# Patient Record
Sex: MALE | Race: WHITE | URBAN - METROPOLITAN AREA
[De-identification: names, ages, dates, MRNs, and addresses within clinical notes are randomized per-mention and may not be internally consistent; named-entity substitution may affect disease eponyms.]

---

## 2024-03-01 LAB
ALBUMIN SERPL-MCNC: NORMAL G/DL
ALP BLD-CCNC: NORMAL U/L
ALT SERPL-CCNC: NORMAL U/L
ANION GAP SERPL CALCULATED.3IONS-SCNC: NORMAL MMOL/L
AST SERPL-CCNC: NORMAL U/L
BILIRUB SERPL-MCNC: NORMAL MG/DL
BILIRUBIN, URINE: NORMAL
BLOOD, URINE: NORMAL
BUN BLDV-MCNC: NORMAL MG/DL
C-REACTIVE PROTEIN: NORMAL
CALCIUM SERPL-MCNC: NORMAL MG/DL
CHLORIDE BLD-SCNC: NORMAL MMOL/L
CLARITY: NORMAL
CO2: NORMAL
COLOR: NORMAL
CREAT SERPL-MCNC: NORMAL MG/DL
EGFR: NORMAL
GLUCOSE BLD-MCNC: NORMAL MG/DL
GLUCOSE URINE: NORMAL
KETONES, URINE: NORMAL
LEUKOCYTE ESTERASE, URINE: NORMAL
LIPASE: NORMAL
NITRITE, URINE: NORMAL
PH UA: NORMAL
POTASSIUM SERPL-SCNC: NORMAL MMOL/L
PROTEIN UA: NORMAL
SODIUM BLD-SCNC: NORMAL MMOL/L
SPECIFIC GRAVITY, URINE: NORMAL
TOTAL PROTEIN: NORMAL
UROBILINOGEN, URINE: NORMAL

## 2024-03-26 ENCOUNTER — HOSPITAL ENCOUNTER (INPATIENT)
Age: 84
LOS: 7 days | Discharge: NURSING, REHAB OR LONG-TERM CARE | DRG: 286 | End: 2024-04-02
Attending: INTERNAL MEDICINE | Admitting: INTERNAL MEDICINE
Payer: MEDICARE

## 2024-03-26 ENCOUNTER — APPOINTMENT (OUTPATIENT)
Dept: GENERAL RADIOLOGY | Age: 84
DRG: 286 | End: 2024-03-26
Attending: INTERNAL MEDICINE
Payer: MEDICARE

## 2024-03-26 DIAGNOSIS — I21.4 NSTEMI (NON-ST ELEVATED MYOCARDIAL INFARCTION) (HCC): ICD-10-CM

## 2024-03-26 DIAGNOSIS — I50.9 ACUTE CONGESTIVE HEART FAILURE, UNSPECIFIED HEART FAILURE TYPE (HCC): Primary | ICD-10-CM

## 2024-03-26 DIAGNOSIS — R79.89 ELEVATED TROPONIN: ICD-10-CM

## 2024-03-26 DIAGNOSIS — M79.89 SWELLING OF LOWER EXTREMITY: ICD-10-CM

## 2024-03-26 DIAGNOSIS — Z86.718 HISTORY OF DVT (DEEP VEIN THROMBOSIS): ICD-10-CM

## 2024-03-26 DIAGNOSIS — Z13.9 ENCOUNTER FOR SCREENING INVOLVING SOCIAL DETERMINANTS OF HEALTH (SDOH): ICD-10-CM

## 2024-03-26 PROBLEM — I50.21 ACUTE SYSTOLIC CONGESTIVE HEART FAILURE (HCC): Status: ACTIVE | Noted: 2024-03-26

## 2024-03-26 PROBLEM — N18.31 STAGE 3A CHRONIC KIDNEY DISEASE (HCC): Status: ACTIVE | Noted: 2024-03-26

## 2024-03-26 PROBLEM — I10 PRIMARY HYPERTENSION: Status: ACTIVE | Noted: 2024-03-26

## 2024-03-26 PROBLEM — J41.8 MIXED SIMPLE AND MUCOPURULENT CHRONIC BRONCHITIS (HCC): Status: ACTIVE | Noted: 2024-03-26

## 2024-03-26 PROBLEM — I48.20 CHRONIC ATRIAL FIBRILLATION (HCC): Status: ACTIVE | Noted: 2024-03-26

## 2024-03-26 PROBLEM — E11.29 TYPE 2 DIABETES MELLITUS WITH KIDNEY COMPLICATION, WITH LONG-TERM CURRENT USE OF INSULIN (HCC): Status: ACTIVE | Noted: 2024-03-26

## 2024-03-26 PROBLEM — Z79.4 TYPE 2 DIABETES MELLITUS WITH KIDNEY COMPLICATION, WITH LONG-TERM CURRENT USE OF INSULIN (HCC): Status: ACTIVE | Noted: 2024-03-26

## 2024-03-26 LAB — GLUCOSE BLD-MCNC: 243 MG/DL (ref 74–99)

## 2024-03-26 PROCEDURE — 2140000000 HC CCU INTERMEDIATE R&B

## 2024-03-26 PROCEDURE — 36415 COLL VENOUS BLD VENIPUNCTURE: CPT

## 2024-03-26 PROCEDURE — 84484 ASSAY OF TROPONIN QUANT: CPT

## 2024-03-26 PROCEDURE — 84439 ASSAY OF FREE THYROXINE: CPT

## 2024-03-26 PROCEDURE — 99223 1ST HOSP IP/OBS HIGH 75: CPT | Performed by: NURSE PRACTITIONER

## 2024-03-26 PROCEDURE — 80048 BASIC METABOLIC PNL TOTAL CA: CPT

## 2024-03-26 PROCEDURE — 83880 ASSAY OF NATRIURETIC PEPTIDE: CPT

## 2024-03-26 PROCEDURE — 6370000000 HC RX 637 (ALT 250 FOR IP): Performed by: NURSE PRACTITIONER

## 2024-03-26 PROCEDURE — 83735 ASSAY OF MAGNESIUM: CPT

## 2024-03-26 PROCEDURE — 85025 COMPLETE CBC W/AUTO DIFF WBC: CPT

## 2024-03-26 PROCEDURE — 84443 ASSAY THYROID STIM HORMONE: CPT

## 2024-03-26 PROCEDURE — 2580000003 HC RX 258: Performed by: NURSE PRACTITIONER

## 2024-03-26 PROCEDURE — 71045 X-RAY EXAM CHEST 1 VIEW: CPT

## 2024-03-26 PROCEDURE — 2700000000 HC OXYGEN THERAPY PER DAY

## 2024-03-26 PROCEDURE — 82962 GLUCOSE BLOOD TEST: CPT

## 2024-03-26 PROCEDURE — 80061 LIPID PANEL: CPT

## 2024-03-26 RX ORDER — SODIUM CHLORIDE 9 MG/ML
INJECTION, SOLUTION INTRAVENOUS PRN
Status: DISCONTINUED | OUTPATIENT
Start: 2024-03-26 | End: 2024-04-01 | Stop reason: SDUPTHER

## 2024-03-26 RX ORDER — DILTIAZEM HYDROCHLORIDE 120 MG/1
120 CAPSULE, EXTENDED RELEASE ORAL DAILY
Status: ON HOLD | COMMUNITY
End: 2024-03-26

## 2024-03-26 RX ORDER — DEXTROSE MONOHYDRATE 100 MG/ML
INJECTION, SOLUTION INTRAVENOUS CONTINUOUS PRN
Status: DISCONTINUED | OUTPATIENT
Start: 2024-03-26 | End: 2024-04-02 | Stop reason: HOSPADM

## 2024-03-26 RX ORDER — LOSARTAN POTASSIUM 25 MG/1
25 TABLET ORAL DAILY
Status: DISCONTINUED | OUTPATIENT
Start: 2024-03-27 | End: 2024-04-02

## 2024-03-26 RX ORDER — ACETAMINOPHEN 325 MG/1
650 TABLET ORAL EVERY 6 HOURS PRN
Status: DISCONTINUED | OUTPATIENT
Start: 2024-03-26 | End: 2024-04-01 | Stop reason: SDUPTHER

## 2024-03-26 RX ORDER — INSULIN GLARGINE 100 [IU]/ML
0.25 INJECTION, SOLUTION SUBCUTANEOUS NIGHTLY
Status: DISCONTINUED | OUTPATIENT
Start: 2024-03-26 | End: 2024-03-27

## 2024-03-26 RX ORDER — POTASSIUM CHLORIDE 7.45 MG/ML
10 INJECTION INTRAVENOUS PRN
Status: DISCONTINUED | OUTPATIENT
Start: 2024-03-26 | End: 2024-03-29

## 2024-03-26 RX ORDER — LISINOPRIL 5 MG/1
5 TABLET ORAL DAILY
Status: ON HOLD | COMMUNITY
End: 2024-03-26

## 2024-03-26 RX ORDER — MAGNESIUM SULFATE IN WATER 40 MG/ML
2000 INJECTION, SOLUTION INTRAVENOUS PRN
Status: DISCONTINUED | OUTPATIENT
Start: 2024-03-26 | End: 2024-03-29

## 2024-03-26 RX ORDER — FUROSEMIDE 10 MG/ML
40 INJECTION INTRAMUSCULAR; INTRAVENOUS 2 TIMES DAILY
Status: COMPLETED | OUTPATIENT
Start: 2024-03-27 | End: 2024-03-29

## 2024-03-26 RX ORDER — ACETAMINOPHEN 650 MG/1
650 SUPPOSITORY RECTAL EVERY 6 HOURS PRN
Status: DISCONTINUED | OUTPATIENT
Start: 2024-03-26 | End: 2024-04-02 | Stop reason: HOSPADM

## 2024-03-26 RX ORDER — DOXAZOSIN 2 MG/1
2 TABLET ORAL DAILY
Status: DISCONTINUED | OUTPATIENT
Start: 2024-03-27 | End: 2024-04-02 | Stop reason: HOSPADM

## 2024-03-26 RX ORDER — SODIUM CHLORIDE 0.9 % (FLUSH) 0.9 %
5-40 SYRINGE (ML) INJECTION PRN
Status: DISCONTINUED | OUTPATIENT
Start: 2024-03-26 | End: 2024-04-02 | Stop reason: HOSPADM

## 2024-03-26 RX ORDER — TERAZOSIN 2 MG/1
2 CAPSULE ORAL NIGHTLY
Status: ON HOLD | COMMUNITY
End: 2024-03-26

## 2024-03-26 RX ORDER — ASPIRIN 81 MG/1
81 TABLET, CHEWABLE ORAL DAILY
Status: DISCONTINUED | OUTPATIENT
Start: 2024-03-27 | End: 2024-04-02 | Stop reason: HOSPADM

## 2024-03-26 RX ORDER — POLYETHYLENE GLYCOL 3350 17 G/17G
17 POWDER, FOR SOLUTION ORAL DAILY PRN
Status: DISCONTINUED | OUTPATIENT
Start: 2024-03-26 | End: 2024-04-02 | Stop reason: HOSPADM

## 2024-03-26 RX ORDER — ONDANSETRON 2 MG/ML
4 INJECTION INTRAMUSCULAR; INTRAVENOUS EVERY 6 HOURS PRN
Status: DISCONTINUED | OUTPATIENT
Start: 2024-03-26 | End: 2024-04-02 | Stop reason: HOSPADM

## 2024-03-26 RX ORDER — IPRATROPIUM BROMIDE AND ALBUTEROL SULFATE 2.5; .5 MG/3ML; MG/3ML
1 SOLUTION RESPIRATORY (INHALATION) EVERY 4 HOURS PRN
Status: DISCONTINUED | OUTPATIENT
Start: 2024-03-26 | End: 2024-04-02 | Stop reason: HOSPADM

## 2024-03-26 RX ORDER — SODIUM CHLORIDE 0.9 % (FLUSH) 0.9 %
5-40 SYRINGE (ML) INJECTION EVERY 12 HOURS SCHEDULED
Status: DISCONTINUED | OUTPATIENT
Start: 2024-03-26 | End: 2024-04-02 | Stop reason: HOSPADM

## 2024-03-26 RX ORDER — AMIODARONE HYDROCHLORIDE 200 MG/1
200 TABLET ORAL DAILY
Status: DISCONTINUED | OUTPATIENT
Start: 2024-03-27 | End: 2024-03-30

## 2024-03-26 RX ORDER — DILTIAZEM HYDROCHLORIDE 120 MG/1
120 CAPSULE, COATED, EXTENDED RELEASE ORAL DAILY
Status: DISCONTINUED | OUTPATIENT
Start: 2024-03-27 | End: 2024-03-30

## 2024-03-26 RX ORDER — INSULIN LISPRO 100 [IU]/ML
0-4 INJECTION, SOLUTION INTRAVENOUS; SUBCUTANEOUS
Status: DISCONTINUED | OUTPATIENT
Start: 2024-03-27 | End: 2024-04-02 | Stop reason: HOSPADM

## 2024-03-26 RX ORDER — GLUCAGON 1 MG/ML
1 KIT INJECTION PRN
Status: DISCONTINUED | OUTPATIENT
Start: 2024-03-26 | End: 2024-04-02 | Stop reason: HOSPADM

## 2024-03-26 RX ORDER — OMEPRAZOLE 20 MG/1
20 CAPSULE, DELAYED RELEASE ORAL DAILY
Status: ON HOLD | COMMUNITY
End: 2024-04-02

## 2024-03-26 RX ORDER — ONDANSETRON 4 MG/1
4 TABLET, ORALLY DISINTEGRATING ORAL EVERY 8 HOURS PRN
Status: DISCONTINUED | OUTPATIENT
Start: 2024-03-26 | End: 2024-04-02 | Stop reason: HOSPADM

## 2024-03-26 RX ORDER — INSULIN LISPRO 100 [IU]/ML
0-4 INJECTION, SOLUTION INTRAVENOUS; SUBCUTANEOUS NIGHTLY
Status: DISCONTINUED | OUTPATIENT
Start: 2024-03-26 | End: 2024-04-02 | Stop reason: HOSPADM

## 2024-03-26 RX ORDER — FUROSEMIDE 20 MG/1
20 TABLET ORAL 2 TIMES DAILY
Status: ON HOLD | COMMUNITY
End: 2024-03-26

## 2024-03-26 RX ORDER — FERROUS SULFATE 325(65) MG
325 TABLET ORAL
Status: DISCONTINUED | OUTPATIENT
Start: 2024-03-27 | End: 2024-04-02 | Stop reason: HOSPADM

## 2024-03-26 RX ORDER — POTASSIUM CHLORIDE 20 MEQ/1
40 TABLET, EXTENDED RELEASE ORAL PRN
Status: DISCONTINUED | OUTPATIENT
Start: 2024-03-26 | End: 2024-03-29

## 2024-03-26 RX ORDER — POTASSIUM CHLORIDE 750 MG/1
10 CAPSULE, EXTENDED RELEASE ORAL DAILY
Status: ON HOLD | COMMUNITY
End: 2024-03-26

## 2024-03-26 RX ADMIN — INSULIN GLARGINE 25 UNITS: 100 INJECTION, SOLUTION SUBCUTANEOUS at 23:23

## 2024-03-26 RX ADMIN — APIXABAN 5 MG: 5 TABLET, FILM COATED ORAL at 23:23

## 2024-03-26 RX ADMIN — SODIUM CHLORIDE, PRESERVATIVE FREE 10 ML: 5 INJECTION INTRAVENOUS at 23:34

## 2024-03-27 ENCOUNTER — APPOINTMENT (OUTPATIENT)
Age: 84
DRG: 286 | End: 2024-03-27
Attending: INTERNAL MEDICINE
Payer: MEDICARE

## 2024-03-27 PROBLEM — I50.9 ACUTE CONGESTIVE HEART FAILURE, UNSPECIFIED HEART FAILURE TYPE (HCC): Status: ACTIVE | Noted: 2024-03-27

## 2024-03-27 PROBLEM — M79.89 SWELLING OF LOWER EXTREMITY: Status: ACTIVE | Noted: 2024-03-27

## 2024-03-27 PROBLEM — I48.0 PAF (PAROXYSMAL ATRIAL FIBRILLATION) (HCC): Status: ACTIVE | Noted: 2024-03-27

## 2024-03-27 PROBLEM — D64.9 ANEMIA: Status: ACTIVE | Noted: 2024-03-27

## 2024-03-27 PROBLEM — I50.9 ACUTE CONGESTIVE HEART FAILURE (HCC): Status: ACTIVE | Noted: 2024-03-27

## 2024-03-27 LAB
ANION GAP SERPL CALCULATED.3IONS-SCNC: 9 MMOL/L (ref 7–16)
ANION GAP SERPL CALCULATED.3IONS-SCNC: 9 MMOL/L (ref 7–16)
B PARAP IS1001 DNA NPH QL NAA+NON-PROBE: NOT DETECTED
B PERT DNA SPEC QL NAA+PROBE: NOT DETECTED
BASOPHILS # BLD: 0.04 K/UL (ref 0–0.2)
BASOPHILS NFR BLD: 0 % (ref 0–2)
BNP SERPL-MCNC: 2914 PG/ML (ref 0–450)
BUN SERPL-MCNC: 22 MG/DL (ref 6–23)
BUN SERPL-MCNC: 24 MG/DL (ref 6–23)
C PNEUM DNA NPH QL NAA+NON-PROBE: NOT DETECTED
CALCIUM SERPL-MCNC: 8.5 MG/DL (ref 8.6–10.2)
CALCIUM SERPL-MCNC: 8.6 MG/DL (ref 8.6–10.2)
CHLORIDE SERPL-SCNC: 106 MMOL/L (ref 98–107)
CHLORIDE SERPL-SCNC: 106 MMOL/L (ref 98–107)
CHOLEST SERPL-MCNC: 138 MG/DL
CO2 SERPL-SCNC: 23 MMOL/L (ref 22–29)
CO2 SERPL-SCNC: 26 MMOL/L (ref 22–29)
CREAT SERPL-MCNC: 1.2 MG/DL (ref 0.7–1.2)
CREAT SERPL-MCNC: 1.3 MG/DL (ref 0.7–1.2)
ECHO AV AREA PEAK VELOCITY: 2 CM2
ECHO AV AREA VTI: 1.9 CM2
ECHO AV CUSP MM: 1.8 CM
ECHO AV MEAN GRADIENT: 8 MMHG
ECHO AV MEAN VELOCITY: 1.3 M/S
ECHO AV PEAK GRADIENT: 13 MMHG
ECHO AV PEAK VELOCITY: 1.8 M/S
ECHO AV VELOCITY RATIO: 0.67
ECHO AV VTI: 34.5 CM
ECHO EST RA PRESSURE: 3 MMHG
ECHO LA DIAMETER: 4.4 CM
ECHO LA VOL A-L A2C: 79 ML (ref 18–58)
ECHO LA VOL A-L A4C: 81 ML (ref 18–58)
ECHO LA VOL MOD A2C: 76 ML (ref 18–58)
ECHO LA VOL MOD A4C: 79 ML (ref 18–58)
ECHO LA VOLUME AREA LENGTH: 82 ML
ECHO LV FRACTIONAL SHORTENING: 35 % (ref 28–44)
ECHO LV INTERNAL DIMENSION DIASTOLIC: 5.7 CM (ref 4.2–5.9)
ECHO LV INTERNAL DIMENSION SYSTOLIC: 3.7 CM
ECHO LV IVSD: 1.1 CM (ref 0.6–1)
ECHO LV MASS 2D: 256.7 G (ref 88–224)
ECHO LV POSTERIOR WALL DIASTOLIC: 1.1 CM (ref 0.6–1)
ECHO LV RELATIVE WALL THICKNESS RATIO: 0.39
ECHO LVOT AREA: 3.1 CM2
ECHO LVOT AV VTI INDEX: 0.63
ECHO LVOT DIAM: 2 CM
ECHO LVOT MEAN GRADIENT: 3 MMHG
ECHO LVOT PEAK GRADIENT: 6 MMHG
ECHO LVOT PEAK VELOCITY: 1.2 M/S
ECHO LVOT SV: 67.8 ML
ECHO LVOT VTI: 21.6 CM
ECHO MV "A" WAVE DURATION: 110.7 MSEC
ECHO MV A VELOCITY: 0.84 M/S
ECHO MV AREA PHT: 3.8 CM2
ECHO MV AREA VTI: 1.8 CM2
ECHO MV E DECELERATION TIME (DT): 262.2 MS
ECHO MV E VELOCITY: 1.04 M/S
ECHO MV E/A RATIO: 1.24
ECHO MV LVOT VTI INDEX: 1.74
ECHO MV MAX VELOCITY: 1.3 M/S
ECHO MV MEAN GRADIENT: 3 MMHG
ECHO MV MEAN VELOCITY: 0.8 M/S
ECHO MV PEAK GRADIENT: 7 MMHG
ECHO MV PRESSURE HALF TIME (PHT): 58.4 MS
ECHO MV VTI: 37.6 CM
ECHO RIGHT VENTRICULAR SYSTOLIC PRESSURE (RVSP): 30 MMHG
ECHO TV REGURGITANT MAX VELOCITY: 2.61 M/S
ECHO TV REGURGITANT PEAK GRADIENT: 27 MMHG
EKG ATRIAL RATE: 108 BPM
EKG Q-T INTERVAL: 384 MS
EKG QRS DURATION: 150 MS
EKG QTC CALCULATION (BAZETT): 519 MS
EKG R AXIS: -69 DEGREES
EKG T AXIS: 16 DEGREES
EKG VENTRICULAR RATE: 110 BPM
EOSINOPHIL # BLD: 0.56 K/UL (ref 0.05–0.5)
EOSINOPHILS RELATIVE PERCENT: 6 % (ref 0–6)
ERYTHROCYTE [DISTWIDTH] IN BLOOD BY AUTOMATED COUNT: 13.8 % (ref 11.5–15)
FLUAV RNA NPH QL NAA+NON-PROBE: NOT DETECTED
FLUBV RNA NPH QL NAA+NON-PROBE: NOT DETECTED
GFR SERPL CREATININE-BSD FRML MDRD: 56 ML/MIN/1.73M2
GFR SERPL CREATININE-BSD FRML MDRD: 61 ML/MIN/1.73M2
GLUCOSE BLD-MCNC: 130 MG/DL (ref 74–99)
GLUCOSE BLD-MCNC: 130 MG/DL (ref 74–99)
GLUCOSE BLD-MCNC: 93 MG/DL (ref 74–99)
GLUCOSE BLD-MCNC: 95 MG/DL (ref 74–99)
GLUCOSE SERPL-MCNC: 187 MG/DL (ref 74–99)
GLUCOSE SERPL-MCNC: 82 MG/DL (ref 74–99)
HADV DNA NPH QL NAA+NON-PROBE: NOT DETECTED
HCOV 229E RNA NPH QL NAA+NON-PROBE: NOT DETECTED
HCOV HKU1 RNA NPH QL NAA+NON-PROBE: NOT DETECTED
HCOV NL63 RNA NPH QL NAA+NON-PROBE: NOT DETECTED
HCOV OC43 RNA NPH QL NAA+NON-PROBE: NOT DETECTED
HCT VFR BLD AUTO: 25.5 % (ref 37–54)
HDLC SERPL-MCNC: 42 MG/DL
HGB BLD-MCNC: 8.3 G/DL (ref 12.5–16.5)
HMPV RNA NPH QL NAA+NON-PROBE: NOT DETECTED
HPIV1 RNA NPH QL NAA+NON-PROBE: NOT DETECTED
HPIV2 RNA NPH QL NAA+NON-PROBE: NOT DETECTED
HPIV3 RNA NPH QL NAA+NON-PROBE: NOT DETECTED
HPIV4 RNA NPH QL NAA+NON-PROBE: NOT DETECTED
IMM GRANULOCYTES # BLD AUTO: 0.04 K/UL (ref 0–0.58)
IMM GRANULOCYTES NFR BLD: 0 % (ref 0–5)
L PNEUMO1 AG UR QL IA.RAPID: NEGATIVE
LDLC SERPL CALC-MCNC: 84 MG/DL
LYMPHOCYTES NFR BLD: 0.95 K/UL (ref 1.5–4)
LYMPHOCYTES RELATIVE PERCENT: 10 % (ref 20–42)
M PNEUMO DNA NPH QL NAA+NON-PROBE: NOT DETECTED
MAGNESIUM SERPL-MCNC: 1.7 MG/DL (ref 1.6–2.6)
MAGNESIUM SERPL-MCNC: 1.7 MG/DL (ref 1.6–2.6)
MCH RBC QN AUTO: 32 PG (ref 26–35)
MCHC RBC AUTO-ENTMCNC: 32.5 G/DL (ref 32–34.5)
MCV RBC AUTO: 98.5 FL (ref 80–99.9)
MONOCYTES NFR BLD: 0.95 K/UL (ref 0.1–0.95)
MONOCYTES NFR BLD: 10 % (ref 2–12)
NEUTROPHILS NFR BLD: 73 % (ref 43–80)
NEUTS SEG NFR BLD: 6.97 K/UL (ref 1.8–7.3)
PLATELET # BLD AUTO: 213 K/UL (ref 130–450)
PMV BLD AUTO: 10.9 FL (ref 7–12)
POTASSIUM SERPL-SCNC: 3.6 MMOL/L (ref 3.5–5)
POTASSIUM SERPL-SCNC: 4.2 MMOL/L (ref 3.5–5)
PROCALCITONIN SERPL-MCNC: 0.08 NG/ML (ref 0–0.08)
RBC # BLD AUTO: 2.59 M/UL (ref 3.8–5.8)
RSV RNA NPH QL NAA+NON-PROBE: NOT DETECTED
RV+EV RNA NPH QL NAA+NON-PROBE: NOT DETECTED
S PNEUM AG SPEC QL: NEGATIVE
SARS-COV-2 RNA NPH QL NAA+NON-PROBE: NOT DETECTED
SODIUM SERPL-SCNC: 138 MMOL/L (ref 132–146)
SODIUM SERPL-SCNC: 141 MMOL/L (ref 132–146)
SPECIMEN DESCRIPTION: NORMAL
SPECIMEN SOURCE: NORMAL
T4 FREE SERPL-MCNC: 1.2 NG/DL (ref 0.9–1.7)
TRIGL SERPL-MCNC: 58 MG/DL
TROPONIN I SERPL HS-MCNC: 134 NG/L (ref 0–11)
TROPONIN I SERPL HS-MCNC: 137 NG/L (ref 0–11)
TSH SERPL DL<=0.05 MIU/L-ACNC: 5.11 UIU/ML (ref 0.27–4.2)
VLDLC SERPL CALC-MCNC: 12 MG/DL
WBC OTHER # BLD: 9.5 K/UL (ref 4.5–11.5)

## 2024-03-27 PROCEDURE — 83735 ASSAY OF MAGNESIUM: CPT

## 2024-03-27 PROCEDURE — 6370000000 HC RX 637 (ALT 250 FOR IP): Performed by: NURSE PRACTITIONER

## 2024-03-27 PROCEDURE — 2580000003 HC RX 258: Performed by: NURSE PRACTITIONER

## 2024-03-27 PROCEDURE — 84484 ASSAY OF TROPONIN QUANT: CPT

## 2024-03-27 PROCEDURE — 93306 TTE W/DOPPLER COMPLETE: CPT

## 2024-03-27 PROCEDURE — 87899 AGENT NOS ASSAY W/OPTIC: CPT

## 2024-03-27 PROCEDURE — 87449 NOS EACH ORGANISM AG IA: CPT

## 2024-03-27 PROCEDURE — 93005 ELECTROCARDIOGRAM TRACING: CPT | Performed by: NURSE PRACTITIONER

## 2024-03-27 PROCEDURE — 6360000002 HC RX W HCPCS: Performed by: INTERNAL MEDICINE

## 2024-03-27 PROCEDURE — 99223 1ST HOSP IP/OBS HIGH 75: CPT | Performed by: INTERNAL MEDICINE

## 2024-03-27 PROCEDURE — 0202U NFCT DS 22 TRGT SARS-COV-2: CPT

## 2024-03-27 PROCEDURE — 2700000000 HC OXYGEN THERAPY PER DAY

## 2024-03-27 PROCEDURE — 94640 AIRWAY INHALATION TREATMENT: CPT

## 2024-03-27 PROCEDURE — 2140000000 HC CCU INTERMEDIATE R&B

## 2024-03-27 PROCEDURE — 84145 PROCALCITONIN (PCT): CPT

## 2024-03-27 PROCEDURE — 99233 SBSQ HOSP IP/OBS HIGH 50: CPT | Performed by: INTERNAL MEDICINE

## 2024-03-27 PROCEDURE — 36415 COLL VENOUS BLD VENIPUNCTURE: CPT

## 2024-03-27 PROCEDURE — 93306 TTE W/DOPPLER COMPLETE: CPT | Performed by: INTERNAL MEDICINE

## 2024-03-27 PROCEDURE — 6370000000 HC RX 637 (ALT 250 FOR IP): Performed by: INTERNAL MEDICINE

## 2024-03-27 PROCEDURE — APPSS180 APP SPLIT SHARED TIME > 60 MINUTES: Performed by: NURSE PRACTITIONER

## 2024-03-27 PROCEDURE — 93010 ELECTROCARDIOGRAM REPORT: CPT | Performed by: INTERNAL MEDICINE

## 2024-03-27 PROCEDURE — 80048 BASIC METABOLIC PNL TOTAL CA: CPT

## 2024-03-27 PROCEDURE — 6360000002 HC RX W HCPCS: Performed by: NURSE PRACTITIONER

## 2024-03-27 PROCEDURE — 82962 GLUCOSE BLOOD TEST: CPT

## 2024-03-27 PROCEDURE — 99222 1ST HOSP IP/OBS MODERATE 55: CPT

## 2024-03-27 RX ORDER — BUDESONIDE 0.5 MG/2ML
0.5 INHALANT ORAL
Status: DISCONTINUED | OUTPATIENT
Start: 2024-03-27 | End: 2024-04-02 | Stop reason: HOSPADM

## 2024-03-27 RX ORDER — PETROLATUM 42 G/100G
OINTMENT TOPICAL 2 TIMES DAILY
Status: DISCONTINUED | OUTPATIENT
Start: 2024-03-27 | End: 2024-03-27

## 2024-03-27 RX ORDER — IPRATROPIUM BROMIDE AND ALBUTEROL SULFATE 2.5; .5 MG/3ML; MG/3ML
1 SOLUTION RESPIRATORY (INHALATION)
Status: DISCONTINUED | OUTPATIENT
Start: 2024-03-27 | End: 2024-04-02 | Stop reason: HOSPADM

## 2024-03-27 RX ORDER — ARFORMOTEROL TARTRATE 15 UG/2ML
15 SOLUTION RESPIRATORY (INHALATION)
Status: DISCONTINUED | OUTPATIENT
Start: 2024-03-27 | End: 2024-04-02 | Stop reason: HOSPADM

## 2024-03-27 RX ORDER — MAGNESIUM SULFATE IN WATER 40 MG/ML
2000 INJECTION, SOLUTION INTRAVENOUS ONCE
Status: COMPLETED | OUTPATIENT
Start: 2024-03-27 | End: 2024-03-27

## 2024-03-27 RX ADMIN — POTASSIUM CHLORIDE 40 MEQ: 1500 TABLET, EXTENDED RELEASE ORAL at 10:31

## 2024-03-27 RX ADMIN — SODIUM CHLORIDE, PRESERVATIVE FREE 10 ML: 5 INJECTION INTRAVENOUS at 08:33

## 2024-03-27 RX ADMIN — APIXABAN 5 MG: 5 TABLET, FILM COATED ORAL at 08:33

## 2024-03-27 RX ADMIN — IPRATROPIUM BROMIDE AND ALBUTEROL SULFATE 1 DOSE: 2.5; .5 SOLUTION RESPIRATORY (INHALATION) at 00:27

## 2024-03-27 RX ADMIN — DOXAZOSIN 2 MG: 2 TABLET ORAL at 08:32

## 2024-03-27 RX ADMIN — FUROSEMIDE 40 MG: 10 INJECTION, SOLUTION INTRAMUSCULAR; INTRAVENOUS at 08:33

## 2024-03-27 RX ADMIN — BUDESONIDE INHALATION 500 MCG: 0.5 SUSPENSION RESPIRATORY (INHALATION) at 12:40

## 2024-03-27 RX ADMIN — IPRATROPIUM BROMIDE AND ALBUTEROL SULFATE 1 DOSE: 2.5; .5 SOLUTION RESPIRATORY (INHALATION) at 19:31

## 2024-03-27 RX ADMIN — IPRATROPIUM BROMIDE AND ALBUTEROL SULFATE 1 DOSE: 2.5; .5 SOLUTION RESPIRATORY (INHALATION) at 15:54

## 2024-03-27 RX ADMIN — LOSARTAN POTASSIUM 25 MG: 25 TABLET, FILM COATED ORAL at 08:33

## 2024-03-27 RX ADMIN — FERROUS SULFATE TAB 325 MG (65 MG ELEMENTAL FE) 325 MG: 325 (65 FE) TAB at 08:33

## 2024-03-27 RX ADMIN — FUROSEMIDE 40 MG: 10 INJECTION, SOLUTION INTRAMUSCULAR; INTRAVENOUS at 17:29

## 2024-03-27 RX ADMIN — DILTIAZEM HYDROCHLORIDE 120 MG: 120 CAPSULE, COATED, EXTENDED RELEASE ORAL at 08:33

## 2024-03-27 RX ADMIN — ARFORMOTEROL TARTRATE 15 MCG: 15 SOLUTION RESPIRATORY (INHALATION) at 12:40

## 2024-03-27 RX ADMIN — ARFORMOTEROL TARTRATE 15 MCG: 15 SOLUTION RESPIRATORY (INHALATION) at 19:31

## 2024-03-27 RX ADMIN — SODIUM CHLORIDE, PRESERVATIVE FREE 10 ML: 5 INJECTION INTRAVENOUS at 20:48

## 2024-03-27 RX ADMIN — AMIODARONE HYDROCHLORIDE 200 MG: 200 TABLET ORAL at 08:33

## 2024-03-27 RX ADMIN — APIXABAN 5 MG: 5 TABLET, FILM COATED ORAL at 20:41

## 2024-03-27 RX ADMIN — MAGNESIUM SULFATE HEPTAHYDRATE 2000 MG: 40 INJECTION, SOLUTION INTRAVENOUS at 10:35

## 2024-03-27 RX ADMIN — PETROLATUM: 420 OINTMENT TOPICAL at 20:49

## 2024-03-27 RX ADMIN — ASPIRIN 81 MG: 81 TABLET, CHEWABLE ORAL at 08:33

## 2024-03-27 RX ADMIN — BUDESONIDE INHALATION 500 MCG: 0.5 SUSPENSION RESPIRATORY (INHALATION) at 19:31

## 2024-03-27 NOTE — FLOWSHEET NOTE
[facility] the [facility] belongings. Pt oriented to room and all questions answered.   03/26/24 2111   Belongings   Dental Appliances Uppers   Vision - Corrective Lenses Eyeglasses;At home   Hearing Aid None   Clothing At home   Jewelry Watch   Electronic Devices None   Weapons (Notify Protective Services/Security) None   Home Medications None   Valuables Given To Patient   Provide Name(s) of Who Valuable(s) Were Given To Luis

## 2024-03-27 NOTE — H&P
Hospitalist History & Physical      PCP: No primary care provider on file.    Date of Service: Pt seen/examined on 3/26/2024     Chief Complaint:  had no chief complaint listed for this encounter.    History of Present Illness:    Mr. Luis Silva, a 83 y.o. year old male  who has a PMH of atrial fibrillation HTN, CKD 3a, Hx DVT, COPD, GERD    Patient admitted as a transfer from [facility] for NSTEMI.     Patient presented to [facility] ED on  3/25 from local nursing home w/ complaints of SOB and BLE edema since being taken off of Lasix. ED workup revealed elevated BNP, elevated trop, interstitial edema, and mod bilateral effusions. He was given 40 mg Lasix IV.  The patient requested to be transferred here for admission for insurance purposes.     On my exam patient reports SOB and congestion has improved since presentation to [facility]. He is somewhat of a poor historian and is unable to tell me when his lasix was stopped but reports it was stopped by \"some Glo character\" who he refers to as doctor death. He is unable to tell me if he has a cardiologist OP.     Past Medical History:    atrial fibrillation HTN, CKD 3a, Hx DVT, COPD, GERD    Past Surgical History:    No past surgical history on file.    Medications Prior to Admission:      Prior to Admission medications    Medication Sig Start Date End Date Taking? Authorizing Provider   insulin NPH (HUMULIN N;NOVOLIN N) 100 UNIT/ML injection vial Inject 30 Units into the skin 2 times daily (before meals)   Yes ProviderDakota MD   omeprazole (PRILOSEC) 20 MG delayed release capsule Take 1 capsule by mouth daily   Yes Dakota Boateng MD       Allergies:  Patient has no known allergies.    Social History:    RESIDENCE: [facility]  TOBACCO:   has no history on file for tobacco use.  ETOH:   has no history on file for alcohol use.    Family History:    Pt unable to provide family hx    Review of Systems:  All bolded are positive; please see HPI  General:  Fever, chills,

## 2024-03-27 NOTE — PLAN OF CARE
Problem: Discharge Planning  Goal: Discharge to home or other facility with appropriate resources  3/27/2024 1111 by Axel Cowan RN  Outcome: Progressing  3/27/2024 0142 by Ellie Diaz RN  Outcome: Progressing     Problem: Chronic Conditions and Co-morbidities  Goal: Patient's chronic conditions and co-morbidity symptoms are monitored and maintained or improved  3/27/2024 1111 by Axel Cowan RN  Outcome: Progressing  3/27/2024 0142 by Ellie Diaz RN  Outcome: Progressing     Problem: Safety - Adult  Goal: Free from fall injury  Outcome: Progressing     Problem: Skin/Tissue Integrity  Goal: Absence of new skin breakdown  Description: 1.  Monitor for areas of redness and/or skin breakdown  2.  Assess vascular access sites hourly  3.  Every 4-6 hours minimum:  Change oxygen saturation probe site  4.  Every 4-6 hours:  If on nasal continuous positive airway pressure, respiratory therapy assess nares and determine need for appliance change or resting period.  Outcome: Progressing     Problem: ABCDS Injury Assessment  Goal: Absence of physical injury  Outcome: Progressing

## 2024-03-28 ENCOUNTER — APPOINTMENT (OUTPATIENT)
Dept: ULTRASOUND IMAGING | Age: 84
DRG: 286 | End: 2024-03-28
Attending: INTERNAL MEDICINE
Payer: MEDICARE

## 2024-03-28 PROBLEM — Z13.9 ENCOUNTER FOR SCREENING INVOLVING SOCIAL DETERMINANTS OF HEALTH (SDOH): Status: ACTIVE | Noted: 2024-03-28

## 2024-03-28 LAB
ANION GAP SERPL CALCULATED.3IONS-SCNC: 10 MMOL/L (ref 7–16)
BASOPHILS # BLD: 0.03 K/UL (ref 0–0.2)
BASOPHILS NFR BLD: 0 % (ref 0–2)
BUN SERPL-MCNC: 19 MG/DL (ref 6–23)
CALCIUM SERPL-MCNC: 8.3 MG/DL (ref 8.6–10.2)
CHLORIDE SERPL-SCNC: 103 MMOL/L (ref 98–107)
CO2 SERPL-SCNC: 25 MMOL/L (ref 22–29)
CREAT SERPL-MCNC: 1.4 MG/DL (ref 0.7–1.2)
EOSINOPHIL # BLD: 0.58 K/UL (ref 0.05–0.5)
EOSINOPHILS RELATIVE PERCENT: 7 % (ref 0–6)
ERYTHROCYTE [DISTWIDTH] IN BLOOD BY AUTOMATED COUNT: 14 % (ref 11.5–15)
FERRITIN SERPL-MCNC: 224 NG/ML
FOLATE SERPL-MCNC: 4.4 NG/ML (ref 4.8–24.2)
GFR SERPL CREATININE-BSD FRML MDRD: 51 ML/MIN/1.73M2
GLUCOSE BLD-MCNC: 148 MG/DL (ref 74–99)
GLUCOSE BLD-MCNC: 153 MG/DL (ref 74–99)
GLUCOSE BLD-MCNC: 161 MG/DL (ref 74–99)
GLUCOSE BLD-MCNC: 252 MG/DL (ref 74–99)
GLUCOSE SERPL-MCNC: 128 MG/DL (ref 74–99)
HCT VFR BLD AUTO: 25.6 % (ref 37–54)
HGB BLD-MCNC: 8 G/DL (ref 12.5–16.5)
IMM GRANULOCYTES # BLD AUTO: 0.05 K/UL (ref 0–0.58)
IMM GRANULOCYTES NFR BLD: 1 % (ref 0–5)
IRON SATN MFR SERPL: 13 % (ref 20–55)
IRON SERPL-MCNC: 21 UG/DL (ref 59–158)
LYMPHOCYTES NFR BLD: 1.17 K/UL (ref 1.5–4)
LYMPHOCYTES RELATIVE PERCENT: 14 % (ref 20–42)
MAGNESIUM SERPL-MCNC: 1.8 MG/DL (ref 1.6–2.6)
MCH RBC QN AUTO: 30.8 PG (ref 26–35)
MCHC RBC AUTO-ENTMCNC: 31.3 G/DL (ref 32–34.5)
MCV RBC AUTO: 98.5 FL (ref 80–99.9)
MONOCYTES NFR BLD: 0.95 K/UL (ref 0.1–0.95)
MONOCYTES NFR BLD: 12 % (ref 2–12)
NEUTROPHILS NFR BLD: 66 % (ref 43–80)
NEUTS SEG NFR BLD: 5.48 K/UL (ref 1.8–7.3)
PLATELET # BLD AUTO: 202 K/UL (ref 130–450)
PMV BLD AUTO: 10.8 FL (ref 7–12)
POTASSIUM SERPL-SCNC: 4 MMOL/L (ref 3.5–5)
RBC # BLD AUTO: 2.6 M/UL (ref 3.8–5.8)
SODIUM SERPL-SCNC: 138 MMOL/L (ref 132–146)
TIBC SERPL-MCNC: 160 UG/DL (ref 250–450)
TROPONIN I SERPL HS-MCNC: 135 NG/L (ref 0–11)
VIT B12 SERPL-MCNC: 1054 PG/ML (ref 211–946)
WBC OTHER # BLD: 8.3 K/UL (ref 4.5–11.5)

## 2024-03-28 PROCEDURE — 6360000002 HC RX W HCPCS: Performed by: NURSE PRACTITIONER

## 2024-03-28 PROCEDURE — 6370000000 HC RX 637 (ALT 250 FOR IP): Performed by: INTERNAL MEDICINE

## 2024-03-28 PROCEDURE — 82728 ASSAY OF FERRITIN: CPT

## 2024-03-28 PROCEDURE — 2140000000 HC CCU INTERMEDIATE R&B

## 2024-03-28 PROCEDURE — 97530 THERAPEUTIC ACTIVITIES: CPT

## 2024-03-28 PROCEDURE — 85025 COMPLETE CBC W/AUTO DIFF WBC: CPT

## 2024-03-28 PROCEDURE — 6370000000 HC RX 637 (ALT 250 FOR IP): Performed by: NURSE PRACTITIONER

## 2024-03-28 PROCEDURE — 93970 EXTREMITY STUDY: CPT

## 2024-03-28 PROCEDURE — 99223 1ST HOSP IP/OBS HIGH 75: CPT | Performed by: INTERNAL MEDICINE

## 2024-03-28 PROCEDURE — 97161 PT EVAL LOW COMPLEX 20 MIN: CPT

## 2024-03-28 PROCEDURE — 94640 AIRWAY INHALATION TREATMENT: CPT

## 2024-03-28 PROCEDURE — 2580000003 HC RX 258: Performed by: INTERNAL MEDICINE

## 2024-03-28 PROCEDURE — 97535 SELF CARE MNGMENT TRAINING: CPT

## 2024-03-28 PROCEDURE — 6360000002 HC RX W HCPCS: Performed by: INTERNAL MEDICINE

## 2024-03-28 PROCEDURE — 36415 COLL VENOUS BLD VENIPUNCTURE: CPT

## 2024-03-28 PROCEDURE — 99232 SBSQ HOSP IP/OBS MODERATE 35: CPT | Performed by: INTERNAL MEDICINE

## 2024-03-28 PROCEDURE — 83550 IRON BINDING TEST: CPT

## 2024-03-28 PROCEDURE — 82746 ASSAY OF FOLIC ACID SERUM: CPT

## 2024-03-28 PROCEDURE — 82962 GLUCOSE BLOOD TEST: CPT

## 2024-03-28 PROCEDURE — 97165 OT EVAL LOW COMPLEX 30 MIN: CPT

## 2024-03-28 PROCEDURE — 2580000003 HC RX 258: Performed by: NURSE PRACTITIONER

## 2024-03-28 PROCEDURE — 83735 ASSAY OF MAGNESIUM: CPT

## 2024-03-28 PROCEDURE — 82607 VITAMIN B-12: CPT

## 2024-03-28 PROCEDURE — 2700000000 HC OXYGEN THERAPY PER DAY

## 2024-03-28 PROCEDURE — 84484 ASSAY OF TROPONIN QUANT: CPT

## 2024-03-28 PROCEDURE — 80048 BASIC METABOLIC PNL TOTAL CA: CPT

## 2024-03-28 PROCEDURE — 83540 ASSAY OF IRON: CPT

## 2024-03-28 RX ORDER — FOLIC ACID 1 MG/1
1 TABLET ORAL DAILY
Status: DISCONTINUED | OUTPATIENT
Start: 2024-03-28 | End: 2024-04-02 | Stop reason: HOSPADM

## 2024-03-28 RX ORDER — PREDNISONE 20 MG/1
20 TABLET ORAL DAILY
Status: COMPLETED | OUTPATIENT
Start: 2024-03-28 | End: 2024-04-01

## 2024-03-28 RX ORDER — GUAIFENESIN 400 MG/1
400 TABLET ORAL 3 TIMES DAILY
Status: COMPLETED | OUTPATIENT
Start: 2024-03-28 | End: 2024-03-31

## 2024-03-28 RX ORDER — MAGNESIUM SULFATE 1 G/100ML
1000 INJECTION INTRAVENOUS ONCE
Status: COMPLETED | OUTPATIENT
Start: 2024-03-28 | End: 2024-03-28

## 2024-03-28 RX ADMIN — SODIUM CHLORIDE, PRESERVATIVE FREE 10 ML: 5 INJECTION INTRAVENOUS at 21:20

## 2024-03-28 RX ADMIN — SODIUM CHLORIDE, PRESERVATIVE FREE 10 ML: 5 INJECTION INTRAVENOUS at 16:24

## 2024-03-28 RX ADMIN — DOXAZOSIN 2 MG: 2 TABLET ORAL at 09:16

## 2024-03-28 RX ADMIN — PETROLATUM: 420 OINTMENT TOPICAL at 09:27

## 2024-03-28 RX ADMIN — BUDESONIDE INHALATION 500 MCG: 0.5 SUSPENSION RESPIRATORY (INHALATION) at 08:02

## 2024-03-28 RX ADMIN — AZITHROMYCIN MONOHYDRATE 500 MG: 500 INJECTION, POWDER, LYOPHILIZED, FOR SOLUTION INTRAVENOUS at 16:34

## 2024-03-28 RX ADMIN — APIXABAN 5 MG: 5 TABLET, FILM COATED ORAL at 21:20

## 2024-03-28 RX ADMIN — SODIUM CHLORIDE, PRESERVATIVE FREE 10 ML: 5 INJECTION INTRAVENOUS at 09:15

## 2024-03-28 RX ADMIN — IPRATROPIUM BROMIDE AND ALBUTEROL SULFATE 1 DOSE: 2.5; .5 SOLUTION RESPIRATORY (INHALATION) at 12:14

## 2024-03-28 RX ADMIN — GUAIFENESIN 400 MG: 400 TABLET ORAL at 16:24

## 2024-03-28 RX ADMIN — IPRATROPIUM BROMIDE AND ALBUTEROL SULFATE 1 DOSE: 2.5; .5 SOLUTION RESPIRATORY (INHALATION) at 08:02

## 2024-03-28 RX ADMIN — ASPIRIN 81 MG: 81 TABLET, CHEWABLE ORAL at 09:15

## 2024-03-28 RX ADMIN — FUROSEMIDE 40 MG: 10 INJECTION, SOLUTION INTRAMUSCULAR; INTRAVENOUS at 09:15

## 2024-03-28 RX ADMIN — APIXABAN 5 MG: 5 TABLET, FILM COATED ORAL at 09:15

## 2024-03-28 RX ADMIN — LOSARTAN POTASSIUM 25 MG: 25 TABLET, FILM COATED ORAL at 09:15

## 2024-03-28 RX ADMIN — BUDESONIDE INHALATION 500 MCG: 0.5 SUSPENSION RESPIRATORY (INHALATION) at 20:25

## 2024-03-28 RX ADMIN — FERROUS SULFATE TAB 325 MG (65 MG ELEMENTAL FE) 325 MG: 325 (65 FE) TAB at 09:15

## 2024-03-28 RX ADMIN — FOLIC ACID 1 MG: 1 TABLET ORAL at 16:29

## 2024-03-28 RX ADMIN — DILTIAZEM HYDROCHLORIDE 120 MG: 120 CAPSULE, COATED, EXTENDED RELEASE ORAL at 09:15

## 2024-03-28 RX ADMIN — PREDNISONE 20 MG: 20 TABLET ORAL at 16:24

## 2024-03-28 RX ADMIN — ARFORMOTEROL TARTRATE 15 MCG: 15 SOLUTION RESPIRATORY (INHALATION) at 20:25

## 2024-03-28 RX ADMIN — GUAIFENESIN 400 MG: 400 TABLET ORAL at 21:20

## 2024-03-28 RX ADMIN — AMIODARONE HYDROCHLORIDE 200 MG: 200 TABLET ORAL at 09:15

## 2024-03-28 RX ADMIN — IPRATROPIUM BROMIDE AND ALBUTEROL SULFATE 1 DOSE: 2.5; .5 SOLUTION RESPIRATORY (INHALATION) at 20:25

## 2024-03-28 RX ADMIN — PETROLATUM: 420 OINTMENT TOPICAL at 21:20

## 2024-03-28 RX ADMIN — MAGNESIUM SULFATE HEPTAHYDRATE 1000 MG: 1 INJECTION, SOLUTION INTRAVENOUS at 09:24

## 2024-03-28 RX ADMIN — ARFORMOTEROL TARTRATE 15 MCG: 15 SOLUTION RESPIRATORY (INHALATION) at 08:02

## 2024-03-28 RX ADMIN — FUROSEMIDE 40 MG: 10 INJECTION, SOLUTION INTRAMUSCULAR; INTRAVENOUS at 16:24

## 2024-03-28 NOTE — PLAN OF CARE
Patient's chart updated to reflect:        - HF care plan, HF education points and HF discharge instructions.  -Orders: 2 gram sodium diet, daily weights, I/O.  -PCP and cardiology follow up appointments to be scheduled within 7 days of hospital discharge.  -CHF education session will be provided to the patient prior to hospital discharge.    Dayami Donaldson, RN MSN,RN  Heart Failure Navigator

## 2024-03-28 NOTE — DISCHARGE INSTR - COC
Continuity of Care Form    Patient Name: Luis Silva   :  1940  MRN:  [id number]    Admit date:  3/26/2024  Discharge date:  24    Code Status Order: DNR-CCA   Advance Directives:     Admitting Physician:  Dee Dee Lopez MD  PCP: No primary care provider on file.    Discharging Nurse: mary Arreola  Discharging Hospital Unit/Room#: 6501/6501-A  Discharging Unit Phone Number: [phone]    Emergency Contact:   Extended Emergency Contact Information  Primary Emergency Contact: Rose silva  Mobile Phone: [phone]  Relation: Spouse    Past Surgical History:  No past surgical history on file.    Immunization History:     There is no immunization history on file for this patient.    Active Problems:  Patient Active Problem List   Diagnosis Code    Acute systolic congestive heart failure (HCC) I50.21    Elevated troponin R79.89    Chronic atrial fibrillation ([facility]) I48.20    Type 2 diabetes mellitus with kidney complication, with long-term current use of insulin ([facility]) E11.29, Z79.4    Primary hypertension I10    Stage 3a chronic kidney disease ([facility]) N18.31    Mixed simple and mucopurulent chronic bronchitis ([facility]) J41.8    Acute congestive heart failure ([facility]) I50.9    Acute congestive heart failure, unspecified heart failure type ([facility]) I50.9    PAF (paroxysmal atrial fibrillation) ([facility]) I48.0    Swelling of lower extremity M79.89    Anemia D64.9       Isolation/Infection:   Isolation            No Isolation          Patient Infection Status       None to display                     Nurse Assessment:  Last Vital Signs: /63   Pulse 66   Temp 97.1 °F (36.2 °C) (Temporal)   Resp 18   Wt 98.2 kg (216 lb 7.9 oz)   SpO2 100%     Last documented pain score (0-10 scale):    Last Weight:   Wt Readings from Last 1 Encounters:   24 98.2 kg (216 lb 7.9 oz)     Mental Status:  oriented and alert    IV Access:  - None    Nursing Mobility/ADLs:  Walking   Assisted  Transfer  Assisted  Bathing

## 2024-03-28 NOTE — CONSULTS
[facility]   Inpatient CHF Nurse Navigator Consult      Cardiologist: Dr. Kenney (new this admission)     Luis Silva is a 83 y.o. (1940) male with a history of HFpEF, most recent EF:  No results found for: \"LVEF\", \"LVEFMODE\"    Patient was awake and alert, laying in bed during the consultation. Currently on the phone with his wife and directed me to give HF education to her.  Patient is agreeable to symptom monitoring, daily weights, and following a low sodium diet and follow up appointments with [facility]. Will follow with Dr. Kenney in the [place] office. Discharge disposition unknown. Wife shared concerns that she does not want him to return to SNF, but feels she is unable to provide care at home. Currently patient to be discharged home with home health care. Consult placed to CHW to assist with [facility] needs and resources available for assistance at home in their area.     Barriers identified during consult contributing to HF Hospitalization:  [x] Limited medication adherence   [x] Poor health literacy, education regarding HF medications provided   [] Pill box provided to patient  [] Difficulty affording medications  [] Difficulty obtaining/ managing medications  [] Prescription assistance information given     [x] Not weighing themselves daily  [x] Weight log provided for easy monitoring  [] Scale provided     [x] Not following low sodium diet  [] Food insecurity   [x] 2 gram sodium diet education provided   [] Low sodium recipes provided  [] Sodium free seasoning provided   [] Low sodium meal delivery options given to patient  [x] Dietician consulted     [] Lack of transportation to appointments     [] Depression, given chronic illness  [] Primary team notified     [] Goals of care need addressed  [] Palliative care consulted     [] CHF CHW consulted, to assist with [facility] needs        Chart Reviewed:  Diet: ADULT DIET; Regular; 4 carb choices (60 
      Inpatient Cardiology Consultation      Reason for Consult: CHF    Consulting Physician: Dr. Kenney    Requesting Physician: Dr. Lopez    Date of Consultation: 3/27/2024    HISTORY OF PRESENT ILLNESS:   Mr. Silva is an 83 year old  male who is previously not known to [facility] in [place], [place] his medical history includes HTN, IDDM, COPD, DVT, CKD stage III A, Atrial Fibrillation, GERD.  He is currently a DNR CCA.  Mr. Silva presented to [facility] on 03/26/2024 as a direct admission from [facility].  Please note this information was obtained by the patient's current medical chart as Mr. Silva is somewhat of a poor historian and there is no family present at the bedside.  According to ED documentation he presented from a nursing facility to the ED at [facility] on 03/25/2024 with complaints of worsening BLE edema and dyspnea.  Reportedly he was recently taken off of Lasix by his PCP and was found to have bilateral moderate pleural effusions with troponin and proBNP elevation at [facility].  Transfer was requested by the patient due to insurance.  Laboratory test obtained at [facility] with troponin 143, 151.  .  K3.9.  BUN/SCR 24/1.39.  WBC 2.36.  H&H 7.4/24.2.  CT of the abdomen and pelvis with moderate bilateral pleural effusions with segmental atelectasis involving both posterior lung bases.  Large hiatal hernia on prior study dated 03/01/2024 not appreciated on today's examination as the stomach is relatively decompressed.  Multiple renal right cyst with largest measuring 2.3 cm.  Nonobstructing left renal stones with largest measuring 0.6 cm.  Umbilical hernia containing fat only.  No acute mechanical bowel obstruction or perforation.  Upon arrival to [facility] his VS were oral temperature 98.9-20-/65-95% on 2 L by nasal cannula.  EKG Atrial Fibrillation with , RBBB.  CXR with increased 
APRN CNP  Palliative Medicine     SUBJECTIVE:     Details of Conversation:    Chart was reviewed.  Patient seen at bedside, awake, alert and oriented, is able to participate in meaningful conversation.  Introduced self to medicine.  Patient informed, he does not have advance directive, however he identified his wife, Rose, as his surrogate decision-maker.  We discussed goals of care, he informed, his goal is to pursue treatments, however he does not want heroic efforts provided if his heart stops.  We discussed DNR CCA versus DNR CC.  He informed he would like to continue to pursue treatment, and is appropriate for his CODE STATUS to be DNR CCA.  He informed, he had a recent abdominal laparoscopy surgery due to twisted bowel, at [facility], and was sent to nursing home for rehab, before this he lives at home with his wife, goal is to return back home.  He wanted his wife be given a call, spoke with wife over the phone, she was updated of the above, she confirmed, CODE STATUS is DNR CCA.  CODE STATUS changed, form placed in soft chart.  She informed, plan is for patient to return home, she is not happy of the care her  had received [facility], and feels her  is in heart failure due to nursing home decision to stop his Lasix.  Her plan is for patient to return home.  She voiced no new concerns.  Comfort support provided.  Goals of care has been established.  No further PM needs has been identified.  Will sign off for now.    Prognosis: Guarded    OBJECTIVE:     /77   Pulse 88   Temp 97.2 °F (36.2 °C) (Temporal)   Resp 18   Wt 99 kg (218 lb 4.1 oz)   SpO2 98%     Physical Examination:  Gen: elderly, thin, NAD, awake, alert   Lungs: respirations easy and not labored,   Heart: regular rate and rhythm, distant heart tones,   Abdomen:  soft, non-tender, healing lap incisions noted  Extremities:edema, moving all extremities    Skin: warm, dry without rashes, lesions, bruising  Neuro: awake,

## 2024-03-28 NOTE — CARE COORDINATION
3/28/24  Transition of care update.  Patient admitted for CHF.  ECHO complete with EF of 60-65%.  Patient continues on IV Lasix to diurese.  Patient is pending a US of his bilateral lower extremities as well as therapy evaluations.  Patient is on 2 liters of 02 with attempt to wean as patient does not have 02 in the community.  Patient was at [facility] but does not want to return.  Discharge goal is to return home per wife and patient.  Family is in agreement to home health care and requested [facility].  Referral was made to [facility] who has accepted and following.  SW/Nav to follow.    Electronically signed by DERRICK Montoya on 3/28/2024 at 9:53 AM

## 2024-03-28 NOTE — PATIENT CARE CONFERENCE
P Quality Flow/Interdisciplinary Rounds Progress Note        Quality Flow Rounds held on March 28, 2024    Disciplines Attending:  Bedside Nurse, , , and Nursing Unit Leadership    Luis Silva was admitted on 3/26/2024  9:03 PM    Anticipated Discharge Date:       Disposition:    Les Score:  Les Scale Score: 17    Readmission Risk              Risk of Unplanned Readmission:  12           Discussed patient goal for the day, patient clinical progression, and barriers to discharge.  The following Goal(s) of the Day/Commitment(s) have been identified:  report labs/diagnostics      Staci Jimenez RN  March 28, 2024

## 2024-03-28 NOTE — PLAN OF CARE
Problem: Discharge Planning  Goal: Discharge to home or other facility with appropriate resources  3/27/2024 2104 by Lori Armas, RN  Outcome: Progressing     Problem: Chronic Conditions and Co-morbidities  Goal: Patient's chronic conditions and co-morbidity symptoms are monitored and maintained or improved  3/27/2024 2104 by Lori Armas, RN  Outcome: Progressing     Problem: Safety - Adult  Goal: Free from fall injury  3/27/2024 2104 by Lori Armas, RN  Outcome: Progressing     Problem: Skin/Tissue Integrity  Goal: Absence of new skin breakdown  Description: 1.  Monitor for areas of redness and/or skin breakdown  2.  Assess vascular access sites hourly  3.  Every 4-6 hours minimum:  Change oxygen saturation probe site  4.  Every 4-6 hours:  If on nasal continuous positive airway pressure, respiratory therapy assess nares and determine need for appliance change or resting period.  3/27/2024 2104 by Lori Armas, RN  Outcome: Progressing     Problem: ABCDS Injury Assessment  Goal: Absence of physical injury  3/27/2024 2104 by Lori Armas, RN  Outcome: Progressing

## 2024-03-29 ENCOUNTER — APPOINTMENT (OUTPATIENT)
Dept: GENERAL RADIOLOGY | Age: 84
DRG: 286 | End: 2024-03-29
Attending: INTERNAL MEDICINE
Payer: MEDICARE

## 2024-03-29 ENCOUNTER — APPOINTMENT (OUTPATIENT)
Dept: NUCLEAR MEDICINE | Age: 84
DRG: 286 | End: 2024-03-29
Attending: INTERNAL MEDICINE
Payer: MEDICARE

## 2024-03-29 ENCOUNTER — APPOINTMENT (OUTPATIENT)
Age: 84
DRG: 286 | End: 2024-03-29
Attending: INTERNAL MEDICINE
Payer: MEDICARE

## 2024-03-29 LAB
ANION GAP SERPL CALCULATED.3IONS-SCNC: 13 MMOL/L (ref 7–16)
BASOPHILS # BLD: 0.02 K/UL (ref 0–0.2)
BASOPHILS NFR BLD: 0 % (ref 0–2)
BNP SERPL-MCNC: 2396 PG/ML (ref 0–450)
BUN SERPL-MCNC: 20 MG/DL (ref 6–23)
CALCIUM SERPL-MCNC: 8.3 MG/DL (ref 8.6–10.2)
CHLORIDE SERPL-SCNC: 103 MMOL/L (ref 98–107)
CO2 SERPL-SCNC: 26 MMOL/L (ref 22–29)
CREAT SERPL-MCNC: 1.3 MG/DL (ref 0.7–1.2)
ECHO BSA: 2.09 M2
EOSINOPHIL # BLD: 0.06 K/UL (ref 0.05–0.5)
EOSINOPHILS RELATIVE PERCENT: 1 % (ref 0–6)
ERYTHROCYTE [DISTWIDTH] IN BLOOD BY AUTOMATED COUNT: 13.9 % (ref 11.5–15)
GFR SERPL CREATININE-BSD FRML MDRD: 56 ML/MIN/1.73M2
GLUCOSE BLD-MCNC: 198 MG/DL (ref 74–99)
GLUCOSE BLD-MCNC: 211 MG/DL (ref 74–99)
GLUCOSE BLD-MCNC: 246 MG/DL (ref 74–99)
GLUCOSE BLD-MCNC: 303 MG/DL (ref 74–99)
GLUCOSE SERPL-MCNC: 202 MG/DL (ref 74–99)
HCT VFR BLD AUTO: 27.2 % (ref 37–54)
HGB BLD-MCNC: 8.7 G/DL (ref 12.5–16.5)
IMM GRANULOCYTES # BLD AUTO: 0.04 K/UL (ref 0–0.58)
IMM GRANULOCYTES NFR BLD: 1 % (ref 0–5)
LYMPHOCYTES NFR BLD: 0.67 K/UL (ref 1.5–4)
LYMPHOCYTES RELATIVE PERCENT: 9 % (ref 20–42)
MAGNESIUM SERPL-MCNC: 1.9 MG/DL (ref 1.6–2.6)
MCH RBC QN AUTO: 31.2 PG (ref 26–35)
MCHC RBC AUTO-ENTMCNC: 32 G/DL (ref 32–34.5)
MCV RBC AUTO: 97.5 FL (ref 80–99.9)
MONOCYTES NFR BLD: 0.94 K/UL (ref 0.1–0.95)
MONOCYTES NFR BLD: 12 % (ref 2–12)
NEUTROPHILS NFR BLD: 78 % (ref 43–80)
NEUTS SEG NFR BLD: 6.12 K/UL (ref 1.8–7.3)
NUC STRESS EJECTION FRACTION: 68 %
PLATELET # BLD AUTO: 229 K/UL (ref 130–450)
PMV BLD AUTO: 11.1 FL (ref 7–12)
POTASSIUM SERPL-SCNC: 3.5 MMOL/L (ref 3.5–5)
RBC # BLD AUTO: 2.79 M/UL (ref 3.8–5.8)
SODIUM SERPL-SCNC: 142 MMOL/L (ref 132–146)
STRESS BASELINE DIAS BP: 70 MMHG
STRESS BASELINE HR: 110 BPM
STRESS BASELINE SYS BP: 124 MMHG
STRESS ESTIMATED WORKLOAD: 1 METS
STRESS PEAK DIAS BP: 70 MMHG
STRESS PEAK SYS BP: 124 MMHG
STRESS PERCENT HR ACHIEVED: 94 %
STRESS POST PEAK HR: 131 BPM
STRESS RATE PRESSURE PRODUCT: NORMAL BPM*MMHG
STRESS STAGE 1 BP: NORMAL MMHG
STRESS STAGE 1 DURATION: 1 MIN:SEC
STRESS TARGET HR: 139 BPM
TID: 1.16
WBC OTHER # BLD: 7.9 K/UL (ref 4.5–11.5)

## 2024-03-29 PROCEDURE — 2140000000 HC CCU INTERMEDIATE R&B

## 2024-03-29 PROCEDURE — 2700000000 HC OXYGEN THERAPY PER DAY

## 2024-03-29 PROCEDURE — 71045 X-RAY EXAM CHEST 1 VIEW: CPT

## 2024-03-29 PROCEDURE — 82962 GLUCOSE BLOOD TEST: CPT

## 2024-03-29 PROCEDURE — 83880 ASSAY OF NATRIURETIC PEPTIDE: CPT

## 2024-03-29 PROCEDURE — 6360000002 HC RX W HCPCS: Performed by: INTERNAL MEDICINE

## 2024-03-29 PROCEDURE — 83735 ASSAY OF MAGNESIUM: CPT

## 2024-03-29 PROCEDURE — 93018 CV STRESS TEST I&R ONLY: CPT | Performed by: INTERNAL MEDICINE

## 2024-03-29 PROCEDURE — 78452 HT MUSCLE IMAGE SPECT MULT: CPT

## 2024-03-29 PROCEDURE — 99232 SBSQ HOSP IP/OBS MODERATE 35: CPT | Performed by: INTERNAL MEDICINE

## 2024-03-29 PROCEDURE — 2580000003 HC RX 258: Performed by: INTERNAL MEDICINE

## 2024-03-29 PROCEDURE — 93016 CV STRESS TEST SUPVJ ONLY: CPT | Performed by: INTERNAL MEDICINE

## 2024-03-29 PROCEDURE — 6370000000 HC RX 637 (ALT 250 FOR IP): Performed by: INTERNAL MEDICINE

## 2024-03-29 PROCEDURE — 2580000003 HC RX 258: Performed by: NURSE PRACTITIONER

## 2024-03-29 PROCEDURE — 78452 HT MUSCLE IMAGE SPECT MULT: CPT | Performed by: INTERNAL MEDICINE

## 2024-03-29 PROCEDURE — 36415 COLL VENOUS BLD VENIPUNCTURE: CPT

## 2024-03-29 PROCEDURE — 80048 BASIC METABOLIC PNL TOTAL CA: CPT

## 2024-03-29 PROCEDURE — 6360000002 HC RX W HCPCS: Performed by: NURSE PRACTITIONER

## 2024-03-29 PROCEDURE — 93017 CV STRESS TEST TRACING ONLY: CPT

## 2024-03-29 PROCEDURE — A9500 TC99M SESTAMIBI: HCPCS | Performed by: RADIOLOGY

## 2024-03-29 PROCEDURE — 99233 SBSQ HOSP IP/OBS HIGH 50: CPT | Performed by: INTERNAL MEDICINE

## 2024-03-29 PROCEDURE — 85025 COMPLETE CBC W/AUTO DIFF WBC: CPT

## 2024-03-29 PROCEDURE — 94640 AIRWAY INHALATION TREATMENT: CPT

## 2024-03-29 PROCEDURE — 3430000000 HC RX DIAGNOSTIC RADIOPHARMACEUTICAL: Performed by: RADIOLOGY

## 2024-03-29 PROCEDURE — 6370000000 HC RX 637 (ALT 250 FOR IP): Performed by: NURSE PRACTITIONER

## 2024-03-29 RX ORDER — TETRAKIS(2-METHOXYISOBUTYLISOCYANIDE)COPPER(I) TETRAFLUOROBORATE 1 MG/ML
10.8 INJECTION, POWDER, LYOPHILIZED, FOR SOLUTION INTRAVENOUS
Status: COMPLETED | OUTPATIENT
Start: 2024-03-29 | End: 2024-03-29

## 2024-03-29 RX ORDER — REGADENOSON 0.08 MG/ML
0.4 INJECTION, SOLUTION INTRAVENOUS
Status: COMPLETED | OUTPATIENT
Start: 2024-03-29 | End: 2024-03-29

## 2024-03-29 RX ORDER — FUROSEMIDE 40 MG/1
40 TABLET ORAL 2 TIMES DAILY
Status: DISCONTINUED | OUTPATIENT
Start: 2024-03-30 | End: 2024-04-02

## 2024-03-29 RX ORDER — POTASSIUM CHLORIDE 7.45 MG/ML
10 INJECTION INTRAVENOUS
Status: DISCONTINUED | OUTPATIENT
Start: 2024-03-29 | End: 2024-03-29

## 2024-03-29 RX ORDER — MAGNESIUM SULFATE 1 G/100ML
1000 INJECTION INTRAVENOUS ONCE
Status: COMPLETED | OUTPATIENT
Start: 2024-03-29 | End: 2024-03-29

## 2024-03-29 RX ORDER — POTASSIUM CHLORIDE 20 MEQ/1
20 TABLET, EXTENDED RELEASE ORAL
Status: DISCONTINUED | OUTPATIENT
Start: 2024-03-30 | End: 2024-04-02 | Stop reason: HOSPADM

## 2024-03-29 RX ORDER — TETRAKIS(2-METHOXYISOBUTYLISOCYANIDE)COPPER(I) TETRAFLUOROBORATE 1 MG/ML
30 INJECTION, POWDER, LYOPHILIZED, FOR SOLUTION INTRAVENOUS
Status: COMPLETED | OUTPATIENT
Start: 2024-03-29 | End: 2024-03-29

## 2024-03-29 RX ADMIN — Medication 10.8 MILLICURIE: at 10:39

## 2024-03-29 RX ADMIN — FERROUS SULFATE TAB 325 MG (65 MG ELEMENTAL FE) 325 MG: 325 (65 FE) TAB at 09:17

## 2024-03-29 RX ADMIN — PETROLATUM: 420 OINTMENT TOPICAL at 09:44

## 2024-03-29 RX ADMIN — FOLIC ACID 1 MG: 1 TABLET ORAL at 09:16

## 2024-03-29 RX ADMIN — DOXAZOSIN 2 MG: 2 TABLET ORAL at 09:17

## 2024-03-29 RX ADMIN — APIXABAN 5 MG: 5 TABLET, FILM COATED ORAL at 09:16

## 2024-03-29 RX ADMIN — POTASSIUM CHLORIDE 40 MEQ: 1500 TABLET, EXTENDED RELEASE ORAL at 09:39

## 2024-03-29 RX ADMIN — AMIODARONE HYDROCHLORIDE 200 MG: 200 TABLET ORAL at 09:16

## 2024-03-29 RX ADMIN — ARFORMOTEROL TARTRATE 15 MCG: 15 SOLUTION RESPIRATORY (INHALATION) at 20:52

## 2024-03-29 RX ADMIN — PETROLATUM: 420 OINTMENT TOPICAL at 20:49

## 2024-03-29 RX ADMIN — Medication 30 MILLICURIE: at 12:20

## 2024-03-29 RX ADMIN — FUROSEMIDE 40 MG: 10 INJECTION, SOLUTION INTRAMUSCULAR; INTRAVENOUS at 17:04

## 2024-03-29 RX ADMIN — SODIUM CHLORIDE, PRESERVATIVE FREE 10 ML: 5 INJECTION INTRAVENOUS at 09:44

## 2024-03-29 RX ADMIN — LOSARTAN POTASSIUM 25 MG: 25 TABLET, FILM COATED ORAL at 09:17

## 2024-03-29 RX ADMIN — SODIUM CHLORIDE, PRESERVATIVE FREE 10 ML: 5 INJECTION INTRAVENOUS at 20:45

## 2024-03-29 RX ADMIN — INSULIN LISPRO 4 UNITS: 100 INJECTION, SOLUTION INTRAVENOUS; SUBCUTANEOUS at 20:45

## 2024-03-29 RX ADMIN — MAGNESIUM SULFATE HEPTAHYDRATE 1000 MG: 1 INJECTION, SOLUTION INTRAVENOUS at 09:39

## 2024-03-29 RX ADMIN — ARFORMOTEROL TARTRATE 15 MCG: 15 SOLUTION RESPIRATORY (INHALATION) at 08:35

## 2024-03-29 RX ADMIN — DILTIAZEM HYDROCHLORIDE 120 MG: 120 CAPSULE, COATED, EXTENDED RELEASE ORAL at 09:17

## 2024-03-29 RX ADMIN — IPRATROPIUM BROMIDE AND ALBUTEROL SULFATE 1 DOSE: 2.5; .5 SOLUTION RESPIRATORY (INHALATION) at 08:35

## 2024-03-29 RX ADMIN — APIXABAN 5 MG: 5 TABLET, FILM COATED ORAL at 20:45

## 2024-03-29 RX ADMIN — GUAIFENESIN 400 MG: 400 TABLET ORAL at 15:10

## 2024-03-29 RX ADMIN — GUAIFENESIN 400 MG: 400 TABLET ORAL at 09:17

## 2024-03-29 RX ADMIN — FUROSEMIDE 40 MG: 10 INJECTION, SOLUTION INTRAMUSCULAR; INTRAVENOUS at 09:23

## 2024-03-29 RX ADMIN — IPRATROPIUM BROMIDE AND ALBUTEROL SULFATE 1 DOSE: 2.5; .5 SOLUTION RESPIRATORY (INHALATION) at 20:52

## 2024-03-29 RX ADMIN — IPRATROPIUM BROMIDE AND ALBUTEROL SULFATE 1 DOSE: 2.5; .5 SOLUTION RESPIRATORY (INHALATION) at 16:20

## 2024-03-29 RX ADMIN — INSULIN LISPRO 1 UNITS: 100 INJECTION, SOLUTION INTRAVENOUS; SUBCUTANEOUS at 17:04

## 2024-03-29 RX ADMIN — BUDESONIDE INHALATION 500 MCG: 0.5 SUSPENSION RESPIRATORY (INHALATION) at 20:52

## 2024-03-29 RX ADMIN — PREDNISONE 20 MG: 20 TABLET ORAL at 09:17

## 2024-03-29 RX ADMIN — AZITHROMYCIN MONOHYDRATE 500 MG: 500 INJECTION, POWDER, LYOPHILIZED, FOR SOLUTION INTRAVENOUS at 15:21

## 2024-03-29 RX ADMIN — GUAIFENESIN 400 MG: 400 TABLET ORAL at 20:45

## 2024-03-29 RX ADMIN — BUDESONIDE INHALATION 500 MCG: 0.5 SUSPENSION RESPIRATORY (INHALATION) at 08:35

## 2024-03-29 RX ADMIN — ASPIRIN 81 MG: 81 TABLET, CHEWABLE ORAL at 09:17

## 2024-03-29 RX ADMIN — REGADENOSON 0.4 MG: 0.08 INJECTION, SOLUTION INTRAVENOUS at 12:17

## 2024-03-29 NOTE — CARE COORDINATION
Results of stress test discussed with patient at length.  Patient offered cardiac catheterization with possible PCI for further evaluation of coronary anatomy. Risks vs benefits vs alternative medical therapies also discussed. Risks including but not limited to death, stroke, MI, arrhthymias, vascular injury requiring emergent open heart surgery, hemorrhage, infection, renal injury, and dye allergy. Patient verbalizes understanding and request to discuss this further with his the family and to think about this himself prior to making a decision.  He also request to know his responsible out of pocket expense regarding this procedure.  Patient notified that if he is agreeable to cardiac catheterization to notify his nurse as he will need Eliquis to be held prior to the procedure.  In addition, he is also aware that this procedure will occur tentatively early next week.  Please see orders.  Dr. Kenney made aware of above.  Electronically signed by VIKAS Rogers CNP on 3/29/24 at 3:41 PM EDT

## 2024-03-29 NOTE — PLAN OF CARE
Problem: Discharge Planning  Goal: Discharge to home or other facility with appropriate resources  3/29/2024 0743 by Lizzette Barrientos RN  Outcome: Progressing     Problem: Chronic Conditions and Co-morbidities  Goal: Patient's chronic conditions and co-morbidity symptoms are monitored and maintained or improved  3/29/2024 0743 by Lizzette Barrientos RN  Outcome: Progressing     Problem: Safety - Adult  Goal: Free from fall injury  3/29/2024 0743 by Lizzette Barrientos RN  Outcome: Progressing     Problem: Skin/Tissue Integrity  Goal: Absence of new skin breakdown  Description: 1.  Monitor for areas of redness and/or skin breakdown  2.  Assess vascular access sites hourly  3.  Every 4-6 hours minimum:  Change oxygen saturation probe site  4.  Every 4-6 hours:  If on nasal continuous positive airway pressure, respiratory therapy assess nares and determine need for appliance change or resting period.  3/29/2024 0743 by Lizzette Barrientos RN  Outcome: Progressing     Problem: ABCDS Injury Assessment  Goal: Absence of physical injury  Outcome: Progressing     Problem: Cardiovascular - Adult  Goal: Maintains optimal cardiac output and hemodynamic stability  Outcome: Progressing     Problem: Metabolic/Fluid and Electrolytes - Adult  Goal: Electrolytes maintained within normal limits  Outcome: Progressing

## 2024-03-29 NOTE — CARE COORDINATION
Care Coordination:  following for discharge planning.  Remains on IV steroids and diuresis. Stress test pending.   Discussed in IDR and with attending. . Spoke to wife on phone .   Met with patient.  Discussed needs for discharge .  PT OT evluations complete.  [facility] 12 and 13.  Wife does not feel she can mange needs at home  She is scheduled to start chemo herself. Patient agrees and they both want to seek placement in Skilled facility.  Do not want to return to [place] because of distance.  Discussed choice list.  Wife selected 1. [facility] 2.  [facility] in either [facility] or [place].   Referrals faxed . Nancy is contact at [facility] phone is  [phone]  Fax [phone].  Julee is contact at [facility] info faxed to her at [phone].  Await acceptance and  complete documentation   Will need pre cert.   1512  Patient has been accepted to [facility].  Facility asked to start pre cert.  Notes indicate he will wean from  diuretics  tomorrow.   Stress test completed today. Patient considering heart cath after discussion with cardiology.   update to wife who agrees to plan for [facility].  Asked facility to start pre cert .  Started transport envelope on soft chart.

## 2024-03-29 NOTE — CARE COORDINATION
Transition of care update: PASRR and ambulette form completed. Transport envelope was placed with pt's soft chart.

## 2024-03-29 NOTE — PATIENT CARE CONFERENCE
P Quality Flow/Interdisciplinary Rounds Progress Note        Quality Flow Rounds held on March 29, 2024    Disciplines Attending:  Bedside Nurse, , , and Nursing Unit Leadership    Luis Silva was admitted on 3/26/2024  9:03 PM    Anticipated Discharge Date:       Disposition:    Les Score:  Les Scale Score: 17    Readmission Risk              Risk of Unplanned Readmission:  16           Discussed patient goal for the day, patient clinical progression, and barriers to discharge.  The following Goal(s) of the Day/Commitment(s) have been identified:  Report labs/diagnostics      Staci Jimenez RN  March 29, 2024

## 2024-03-30 PROBLEM — I48.19 PERSISTENT ATRIAL FIBRILLATION (HCC): Status: ACTIVE | Noted: 2024-03-30

## 2024-03-30 PROBLEM — R94.31 EKG, ABNORMAL: Status: ACTIVE | Noted: 2024-03-30

## 2024-03-30 PROBLEM — R94.39 ABNORMAL NUCLEAR STRESS TEST: Status: ACTIVE | Noted: 2024-03-30

## 2024-03-30 LAB
ANION GAP SERPL CALCULATED.3IONS-SCNC: 9 MMOL/L (ref 7–16)
BASOPHILS # BLD: 0.02 K/UL (ref 0–0.2)
BASOPHILS NFR BLD: 0 % (ref 0–2)
BNP SERPL-MCNC: 2106 PG/ML (ref 0–450)
BUN SERPL-MCNC: 20 MG/DL (ref 6–23)
CALCIUM SERPL-MCNC: 8.1 MG/DL (ref 8.6–10.2)
CHLORIDE SERPL-SCNC: 103 MMOL/L (ref 98–107)
CO2 SERPL-SCNC: 28 MMOL/L (ref 22–29)
CREAT SERPL-MCNC: 1.3 MG/DL (ref 0.7–1.2)
EOSINOPHIL # BLD: 0.15 K/UL (ref 0.05–0.5)
EOSINOPHILS RELATIVE PERCENT: 2 % (ref 0–6)
ERYTHROCYTE [DISTWIDTH] IN BLOOD BY AUTOMATED COUNT: 13.8 % (ref 11.5–15)
GFR SERPL CREATININE-BSD FRML MDRD: 56 ML/MIN/1.73M2
GLUCOSE BLD-MCNC: 177 MG/DL (ref 74–99)
GLUCOSE BLD-MCNC: 231 MG/DL (ref 74–99)
GLUCOSE BLD-MCNC: 263 MG/DL (ref 74–99)
GLUCOSE BLD-MCNC: 274 MG/DL (ref 74–99)
GLUCOSE SERPL-MCNC: 180 MG/DL (ref 74–99)
HCT VFR BLD AUTO: 26.9 % (ref 37–54)
HGB BLD-MCNC: 8.6 G/DL (ref 12.5–16.5)
IMM GRANULOCYTES # BLD AUTO: 0.07 K/UL (ref 0–0.58)
IMM GRANULOCYTES NFR BLD: 1 % (ref 0–5)
LYMPHOCYTES NFR BLD: 0.98 K/UL (ref 1.5–4)
LYMPHOCYTES RELATIVE PERCENT: 14 % (ref 20–42)
MAGNESIUM SERPL-MCNC: 2 MG/DL (ref 1.6–2.6)
MCH RBC QN AUTO: 30.9 PG (ref 26–35)
MCHC RBC AUTO-ENTMCNC: 32 G/DL (ref 32–34.5)
MCV RBC AUTO: 96.8 FL (ref 80–99.9)
MONOCYTES NFR BLD: 0.82 K/UL (ref 0.1–0.95)
MONOCYTES NFR BLD: 11 % (ref 2–12)
NEUTROPHILS NFR BLD: 72 % (ref 43–80)
NEUTS SEG NFR BLD: 5.19 K/UL (ref 1.8–7.3)
PLATELET # BLD AUTO: 239 K/UL (ref 130–450)
PMV BLD AUTO: 11 FL (ref 7–12)
POTASSIUM SERPL-SCNC: 3.2 MMOL/L (ref 3.5–5)
RBC # BLD AUTO: 2.78 M/UL (ref 3.8–5.8)
SODIUM SERPL-SCNC: 140 MMOL/L (ref 132–146)
WBC OTHER # BLD: 7.2 K/UL (ref 4.5–11.5)

## 2024-03-30 PROCEDURE — 99232 SBSQ HOSP IP/OBS MODERATE 35: CPT | Performed by: INTERNAL MEDICINE

## 2024-03-30 PROCEDURE — 94640 AIRWAY INHALATION TREATMENT: CPT

## 2024-03-30 PROCEDURE — 85025 COMPLETE CBC W/AUTO DIFF WBC: CPT

## 2024-03-30 PROCEDURE — 6370000000 HC RX 637 (ALT 250 FOR IP): Performed by: INTERNAL MEDICINE

## 2024-03-30 PROCEDURE — 2580000003 HC RX 258: Performed by: NURSE PRACTITIONER

## 2024-03-30 PROCEDURE — 2580000003 HC RX 258: Performed by: INTERNAL MEDICINE

## 2024-03-30 PROCEDURE — 80048 BASIC METABOLIC PNL TOTAL CA: CPT

## 2024-03-30 PROCEDURE — 6370000000 HC RX 637 (ALT 250 FOR IP): Performed by: NURSE PRACTITIONER

## 2024-03-30 PROCEDURE — 2700000000 HC OXYGEN THERAPY PER DAY

## 2024-03-30 PROCEDURE — 83880 ASSAY OF NATRIURETIC PEPTIDE: CPT

## 2024-03-30 PROCEDURE — 99233 SBSQ HOSP IP/OBS HIGH 50: CPT | Performed by: INTERNAL MEDICINE

## 2024-03-30 PROCEDURE — 83735 ASSAY OF MAGNESIUM: CPT

## 2024-03-30 PROCEDURE — 36415 COLL VENOUS BLD VENIPUNCTURE: CPT

## 2024-03-30 PROCEDURE — 2140000000 HC CCU INTERMEDIATE R&B

## 2024-03-30 PROCEDURE — 6360000002 HC RX W HCPCS: Performed by: INTERNAL MEDICINE

## 2024-03-30 PROCEDURE — 82962 GLUCOSE BLOOD TEST: CPT

## 2024-03-30 RX ORDER — POTASSIUM CHLORIDE 20 MEQ/1
40 TABLET, EXTENDED RELEASE ORAL ONCE
Status: COMPLETED | OUTPATIENT
Start: 2024-03-30 | End: 2024-03-30

## 2024-03-30 RX ORDER — AMIODARONE HYDROCHLORIDE 200 MG/1
200 TABLET ORAL 2 TIMES DAILY
Status: DISCONTINUED | OUTPATIENT
Start: 2024-03-30 | End: 2024-03-31

## 2024-03-30 RX ORDER — INSULIN GLARGINE 100 [IU]/ML
5 INJECTION, SOLUTION SUBCUTANEOUS NIGHTLY
Status: DISCONTINUED | OUTPATIENT
Start: 2024-03-30 | End: 2024-04-02 | Stop reason: HOSPADM

## 2024-03-30 RX ORDER — METOPROLOL SUCCINATE 50 MG/1
50 TABLET, EXTENDED RELEASE ORAL 2 TIMES DAILY
Status: DISCONTINUED | OUTPATIENT
Start: 2024-03-30 | End: 2024-04-02 | Stop reason: HOSPADM

## 2024-03-30 RX ADMIN — ASPIRIN 81 MG: 81 TABLET, CHEWABLE ORAL at 09:00

## 2024-03-30 RX ADMIN — INSULIN LISPRO 2 UNITS: 100 INJECTION, SOLUTION INTRAVENOUS; SUBCUTANEOUS at 17:33

## 2024-03-30 RX ADMIN — GUAIFENESIN 400 MG: 400 TABLET ORAL at 15:15

## 2024-03-30 RX ADMIN — SODIUM CHLORIDE, PRESERVATIVE FREE 10 ML: 5 INJECTION INTRAVENOUS at 09:38

## 2024-03-30 RX ADMIN — IPRATROPIUM BROMIDE AND ALBUTEROL SULFATE 1 DOSE: 2.5; .5 SOLUTION RESPIRATORY (INHALATION) at 15:56

## 2024-03-30 RX ADMIN — APIXABAN 5 MG: 5 TABLET, FILM COATED ORAL at 09:32

## 2024-03-30 RX ADMIN — GUAIFENESIN 400 MG: 400 TABLET ORAL at 20:49

## 2024-03-30 RX ADMIN — IPRATROPIUM BROMIDE AND ALBUTEROL SULFATE 1 DOSE: 2.5; .5 SOLUTION RESPIRATORY (INHALATION) at 20:03

## 2024-03-30 RX ADMIN — PETROLATUM: 420 OINTMENT TOPICAL at 09:41

## 2024-03-30 RX ADMIN — ARFORMOTEROL TARTRATE 15 MCG: 15 SOLUTION RESPIRATORY (INHALATION) at 08:42

## 2024-03-30 RX ADMIN — IPRATROPIUM BROMIDE AND ALBUTEROL SULFATE 1 DOSE: 2.5; .5 SOLUTION RESPIRATORY (INHALATION) at 08:42

## 2024-03-30 RX ADMIN — FUROSEMIDE 40 MG: 40 TABLET ORAL at 09:32

## 2024-03-30 RX ADMIN — FUROSEMIDE 40 MG: 40 TABLET ORAL at 17:34

## 2024-03-30 RX ADMIN — FERROUS SULFATE TAB 325 MG (65 MG ELEMENTAL FE) 325 MG: 325 (65 FE) TAB at 09:32

## 2024-03-30 RX ADMIN — SODIUM CHLORIDE, PRESERVATIVE FREE 10 ML: 5 INJECTION INTRAVENOUS at 20:50

## 2024-03-30 RX ADMIN — INSULIN GLARGINE 5 UNITS: 100 INJECTION, SOLUTION SUBCUTANEOUS at 20:50

## 2024-03-30 RX ADMIN — POTASSIUM CHLORIDE 20 MEQ: 1500 TABLET, EXTENDED RELEASE ORAL at 15:15

## 2024-03-30 RX ADMIN — DOXAZOSIN 2 MG: 2 TABLET ORAL at 09:32

## 2024-03-30 RX ADMIN — METOPROLOL SUCCINATE 50 MG: 50 TABLET, FILM COATED, EXTENDED RELEASE ORAL at 20:49

## 2024-03-30 RX ADMIN — IPRATROPIUM BROMIDE AND ALBUTEROL SULFATE 1 DOSE: 2.5; .5 SOLUTION RESPIRATORY (INHALATION) at 11:54

## 2024-03-30 RX ADMIN — FOLIC ACID 1 MG: 1 TABLET ORAL at 09:33

## 2024-03-30 RX ADMIN — GUAIFENESIN 400 MG: 400 TABLET ORAL at 09:33

## 2024-03-30 RX ADMIN — ARFORMOTEROL TARTRATE 15 MCG: 15 SOLUTION RESPIRATORY (INHALATION) at 20:03

## 2024-03-30 RX ADMIN — ASPIRIN 81 MG: 81 TABLET, CHEWABLE ORAL at 20:50

## 2024-03-30 RX ADMIN — POTASSIUM CHLORIDE 40 MEQ: 1500 TABLET, EXTENDED RELEASE ORAL at 09:24

## 2024-03-30 RX ADMIN — BUDESONIDE INHALATION 500 MCG: 0.5 SUSPENSION RESPIRATORY (INHALATION) at 08:42

## 2024-03-30 RX ADMIN — PETROLATUM: 420 OINTMENT TOPICAL at 20:51

## 2024-03-30 RX ADMIN — PREDNISONE 20 MG: 20 TABLET ORAL at 09:32

## 2024-03-30 RX ADMIN — AMIODARONE HYDROCHLORIDE 200 MG: 200 TABLET ORAL at 09:32

## 2024-03-30 RX ADMIN — DILTIAZEM HYDROCHLORIDE 120 MG: 120 CAPSULE, COATED, EXTENDED RELEASE ORAL at 09:32

## 2024-03-30 RX ADMIN — AZITHROMYCIN MONOHYDRATE 500 MG: 500 INJECTION, POWDER, LYOPHILIZED, FOR SOLUTION INTRAVENOUS at 15:14

## 2024-03-30 RX ADMIN — LOSARTAN POTASSIUM 25 MG: 25 TABLET, FILM COATED ORAL at 09:32

## 2024-03-30 RX ADMIN — AMIODARONE HYDROCHLORIDE 200 MG: 200 TABLET ORAL at 20:49

## 2024-03-30 RX ADMIN — BUDESONIDE INHALATION 500 MCG: 0.5 SUSPENSION RESPIRATORY (INHALATION) at 20:03

## 2024-03-31 ENCOUNTER — APPOINTMENT (OUTPATIENT)
Dept: GENERAL RADIOLOGY | Age: 84
DRG: 286 | End: 2024-03-31
Attending: INTERNAL MEDICINE
Payer: MEDICARE

## 2024-03-31 LAB
ANION GAP SERPL CALCULATED.3IONS-SCNC: 8 MMOL/L (ref 7–16)
BASOPHILS # BLD: 0.03 K/UL (ref 0–0.2)
BASOPHILS NFR BLD: 0 % (ref 0–2)
BUN SERPL-MCNC: 22 MG/DL (ref 6–23)
CALCIUM SERPL-MCNC: 8.1 MG/DL (ref 8.6–10.2)
CHLORIDE SERPL-SCNC: 103 MMOL/L (ref 98–107)
CO2 SERPL-SCNC: 29 MMOL/L (ref 22–29)
CREAT SERPL-MCNC: 1.3 MG/DL (ref 0.7–1.2)
EOSINOPHIL # BLD: 0.09 K/UL (ref 0.05–0.5)
EOSINOPHILS RELATIVE PERCENT: 1 % (ref 0–6)
ERYTHROCYTE [DISTWIDTH] IN BLOOD BY AUTOMATED COUNT: 13.9 % (ref 11.5–15)
GFR SERPL CREATININE-BSD FRML MDRD: 56 ML/MIN/1.73M2
GLUCOSE BLD-MCNC: 194 MG/DL (ref 74–99)
GLUCOSE BLD-MCNC: 227 MG/DL (ref 74–99)
GLUCOSE BLD-MCNC: 303 MG/DL (ref 74–99)
GLUCOSE BLD-MCNC: 332 MG/DL (ref 74–99)
GLUCOSE SERPL-MCNC: 162 MG/DL (ref 74–99)
HCT VFR BLD AUTO: 26.5 % (ref 37–54)
HGB BLD-MCNC: 8.6 G/DL (ref 12.5–16.5)
IMM GRANULOCYTES # BLD AUTO: 0.08 K/UL (ref 0–0.58)
IMM GRANULOCYTES NFR BLD: 1 % (ref 0–5)
LYMPHOCYTES NFR BLD: 0.99 K/UL (ref 1.5–4)
LYMPHOCYTES RELATIVE PERCENT: 11 % (ref 20–42)
MAGNESIUM SERPL-MCNC: 1.8 MG/DL (ref 1.6–2.6)
MCH RBC QN AUTO: 31.7 PG (ref 26–35)
MCHC RBC AUTO-ENTMCNC: 32.5 G/DL (ref 32–34.5)
MCV RBC AUTO: 97.8 FL (ref 80–99.9)
MONOCYTES NFR BLD: 0.96 K/UL (ref 0.1–0.95)
MONOCYTES NFR BLD: 11 % (ref 2–12)
NEUTROPHILS NFR BLD: 76 % (ref 43–80)
NEUTS SEG NFR BLD: 6.77 K/UL (ref 1.8–7.3)
PLATELET # BLD AUTO: 241 K/UL (ref 130–450)
PMV BLD AUTO: 11.1 FL (ref 7–12)
POTASSIUM SERPL-SCNC: 3.6 MMOL/L (ref 3.5–5)
RBC # BLD AUTO: 2.71 M/UL (ref 3.8–5.8)
SODIUM SERPL-SCNC: 140 MMOL/L (ref 132–146)
WBC OTHER # BLD: 8.9 K/UL (ref 4.5–11.5)

## 2024-03-31 PROCEDURE — 94640 AIRWAY INHALATION TREATMENT: CPT

## 2024-03-31 PROCEDURE — 2140000000 HC CCU INTERMEDIATE R&B

## 2024-03-31 PROCEDURE — 80048 BASIC METABOLIC PNL TOTAL CA: CPT

## 2024-03-31 PROCEDURE — 85025 COMPLETE CBC W/AUTO DIFF WBC: CPT

## 2024-03-31 PROCEDURE — 6370000000 HC RX 637 (ALT 250 FOR IP): Performed by: INTERNAL MEDICINE

## 2024-03-31 PROCEDURE — 6360000002 HC RX W HCPCS: Performed by: INTERNAL MEDICINE

## 2024-03-31 PROCEDURE — 99232 SBSQ HOSP IP/OBS MODERATE 35: CPT | Performed by: INTERNAL MEDICINE

## 2024-03-31 PROCEDURE — 2700000000 HC OXYGEN THERAPY PER DAY

## 2024-03-31 PROCEDURE — 36415 COLL VENOUS BLD VENIPUNCTURE: CPT

## 2024-03-31 PROCEDURE — 71045 X-RAY EXAM CHEST 1 VIEW: CPT

## 2024-03-31 PROCEDURE — 2580000003 HC RX 258: Performed by: NURSE PRACTITIONER

## 2024-03-31 PROCEDURE — 83735 ASSAY OF MAGNESIUM: CPT

## 2024-03-31 PROCEDURE — 97530 THERAPEUTIC ACTIVITIES: CPT

## 2024-03-31 PROCEDURE — 97535 SELF CARE MNGMENT TRAINING: CPT

## 2024-03-31 PROCEDURE — 6370000000 HC RX 637 (ALT 250 FOR IP): Performed by: NURSE PRACTITIONER

## 2024-03-31 PROCEDURE — 82962 GLUCOSE BLOOD TEST: CPT

## 2024-03-31 RX ORDER — GUAIFENESIN 400 MG/1
400 TABLET ORAL 3 TIMES DAILY
Status: DISCONTINUED | OUTPATIENT
Start: 2024-03-31 | End: 2024-04-02 | Stop reason: HOSPADM

## 2024-03-31 RX ORDER — POTASSIUM CHLORIDE 20 MEQ/1
20 TABLET, EXTENDED RELEASE ORAL ONCE
Status: COMPLETED | OUTPATIENT
Start: 2024-03-31 | End: 2024-03-31

## 2024-03-31 RX ORDER — MAGNESIUM SULFATE 1 G/100ML
1000 INJECTION INTRAVENOUS ONCE
Status: COMPLETED | OUTPATIENT
Start: 2024-03-31 | End: 2024-03-31

## 2024-03-31 RX ORDER — AMIODARONE HYDROCHLORIDE 200 MG/1
200 TABLET ORAL DAILY
Status: DISCONTINUED | OUTPATIENT
Start: 2024-04-01 | End: 2024-04-02 | Stop reason: HOSPADM

## 2024-03-31 RX ADMIN — POTASSIUM CHLORIDE 20 MEQ: 1500 TABLET, EXTENDED RELEASE ORAL at 08:02

## 2024-03-31 RX ADMIN — ARFORMOTEROL TARTRATE 15 MCG: 15 SOLUTION RESPIRATORY (INHALATION) at 21:24

## 2024-03-31 RX ADMIN — INSULIN LISPRO 3 UNITS: 100 INJECTION, SOLUTION INTRAVENOUS; SUBCUTANEOUS at 16:54

## 2024-03-31 RX ADMIN — METOPROLOL SUCCINATE 50 MG: 50 TABLET, FILM COATED, EXTENDED RELEASE ORAL at 20:07

## 2024-03-31 RX ADMIN — BUDESONIDE INHALATION 500 MCG: 0.5 SUSPENSION RESPIRATORY (INHALATION) at 09:53

## 2024-03-31 RX ADMIN — BUDESONIDE INHALATION 500 MCG: 0.5 SUSPENSION RESPIRATORY (INHALATION) at 21:24

## 2024-03-31 RX ADMIN — IPRATROPIUM BROMIDE AND ALBUTEROL SULFATE 1 DOSE: 2.5; .5 SOLUTION RESPIRATORY (INHALATION) at 09:54

## 2024-03-31 RX ADMIN — FERROUS SULFATE TAB 325 MG (65 MG ELEMENTAL FE) 325 MG: 325 (65 FE) TAB at 08:02

## 2024-03-31 RX ADMIN — ARFORMOTEROL TARTRATE 15 MCG: 15 SOLUTION RESPIRATORY (INHALATION) at 09:54

## 2024-03-31 RX ADMIN — AMIODARONE HYDROCHLORIDE 200 MG: 200 TABLET ORAL at 08:02

## 2024-03-31 RX ADMIN — IPRATROPIUM BROMIDE AND ALBUTEROL SULFATE 1 DOSE: 2.5; .5 SOLUTION RESPIRATORY (INHALATION) at 16:52

## 2024-03-31 RX ADMIN — GUAIFENESIN 400 MG: 400 TABLET ORAL at 20:07

## 2024-03-31 RX ADMIN — DOXAZOSIN 2 MG: 2 TABLET ORAL at 10:24

## 2024-03-31 RX ADMIN — IPRATROPIUM BROMIDE AND ALBUTEROL SULFATE 1 DOSE: 2.5; .5 SOLUTION RESPIRATORY (INHALATION) at 13:14

## 2024-03-31 RX ADMIN — POTASSIUM CHLORIDE 20 MEQ: 1500 TABLET, EXTENDED RELEASE ORAL at 12:32

## 2024-03-31 RX ADMIN — ASPIRIN 81 MG: 81 TABLET, CHEWABLE ORAL at 20:07

## 2024-03-31 RX ADMIN — IPRATROPIUM BROMIDE AND ALBUTEROL SULFATE 1 DOSE: 2.5; .5 SOLUTION RESPIRATORY (INHALATION) at 21:24

## 2024-03-31 RX ADMIN — FUROSEMIDE 40 MG: 40 TABLET ORAL at 16:54

## 2024-03-31 RX ADMIN — SODIUM CHLORIDE, PRESERVATIVE FREE 10 ML: 5 INJECTION INTRAVENOUS at 20:07

## 2024-03-31 RX ADMIN — MAGNESIUM SULFATE HEPTAHYDRATE 1000 MG: 1 INJECTION, SOLUTION INTRAVENOUS at 12:32

## 2024-03-31 RX ADMIN — INSULIN LISPRO 4 UNITS: 100 INJECTION, SOLUTION INTRAVENOUS; SUBCUTANEOUS at 20:06

## 2024-03-31 RX ADMIN — PREDNISONE 20 MG: 20 TABLET ORAL at 08:02

## 2024-03-31 RX ADMIN — PETROLATUM: 420 OINTMENT TOPICAL at 20:07

## 2024-03-31 RX ADMIN — SODIUM CHLORIDE, PRESERVATIVE FREE 10 ML: 5 INJECTION INTRAVENOUS at 08:05

## 2024-03-31 RX ADMIN — METOPROLOL SUCCINATE 50 MG: 50 TABLET, FILM COATED, EXTENDED RELEASE ORAL at 08:02

## 2024-03-31 RX ADMIN — LOSARTAN POTASSIUM 25 MG: 25 TABLET, FILM COATED ORAL at 08:02

## 2024-03-31 RX ADMIN — GUAIFENESIN 400 MG: 400 TABLET ORAL at 10:25

## 2024-03-31 RX ADMIN — GUAIFENESIN 400 MG: 400 TABLET ORAL at 08:02

## 2024-03-31 RX ADMIN — FOLIC ACID 1 MG: 1 TABLET ORAL at 08:02

## 2024-03-31 RX ADMIN — INSULIN LISPRO 1 UNITS: 100 INJECTION, SOLUTION INTRAVENOUS; SUBCUTANEOUS at 08:02

## 2024-03-31 RX ADMIN — GUAIFENESIN 400 MG: 400 TABLET ORAL at 16:54

## 2024-03-31 RX ADMIN — INSULIN GLARGINE 5 UNITS: 100 INJECTION, SOLUTION SUBCUTANEOUS at 20:06

## 2024-03-31 RX ADMIN — FUROSEMIDE 40 MG: 40 TABLET ORAL at 08:02

## 2024-03-31 NOTE — PLAN OF CARE
Problem: Discharge Planning  Goal: Discharge to home or other facility with appropriate resources  3/31/2024 1203 by Cinthya Rudolph RN  Outcome: Progressing  3/31/2024 0742 by Cinthya Rudolph RN  Outcome: Progressing  3/30/2024 2214 by Ellie Diaz RN  Outcome: Progressing     Problem: Chronic Conditions and Co-morbidities  Goal: Patient's chronic conditions and co-morbidity symptoms are monitored and maintained or improved  3/31/2024 1203 by Cinthya Rudolph RN  Outcome: Progressing  3/31/2024 0742 by Cinthya Rudolph RN  Outcome: Progressing  3/30/2024 2214 by Ellie Diaz RN  Outcome: Progressing     Problem: Safety - Adult  Goal: Free from fall injury  3/31/2024 1203 by Cinthya Rudolph RN  Outcome: Progressing  3/31/2024 0742 by Cinthya Rudolph RN  Outcome: Progressing  3/30/2024 2214 by Ellie Diaz RN  Outcome: Progressing     Problem: Skin/Tissue Integrity  Goal: Absence of new skin breakdown  Description: 1.  Monitor for areas of redness and/or skin breakdown  2.  Assess vascular access sites hourly  3.  Every 4-6 hours minimum:  Change oxygen saturation probe site  4.  Every 4-6 hours:  If on nasal continuous positive airway pressure, respiratory therapy assess nares and determine need for appliance change or resting period.  3/31/2024 1203 by Cinthya Rudolph RN  Outcome: Progressing  3/31/2024 0742 by Cinthya Rudolph RN  Outcome: Progressing  3/30/2024 2214 by Ellie Diaz RN  Outcome: Progressing     Problem: ABCDS Injury Assessment  Goal: Absence of physical injury  3/31/2024 1203 by Cinthya Rudolph RN  Outcome: Progressing  3/31/2024 0742 by Cinthya Rudolph RN  Outcome: Progressing  3/30/2024 2214 by Ellie Diaz RN  Outcome: Progressing     Problem: Cardiovascular - Adult  Goal: Maintains optimal cardiac output and hemodynamic stability  3/31/2024 1203 by Cinthya Rudolph RN  Outcome: Progressing  3/31/2024 0742 by Cinthya Rudolph, RN  Outcome:

## 2024-03-31 NOTE — PLAN OF CARE
Problem: Discharge Planning  Goal: Discharge to home or other facility with appropriate resources  Outcome: Progressing     Problem: Chronic Conditions and Co-morbidities  Goal: Patient's chronic conditions and co-morbidity symptoms are monitored and maintained or improved  Outcome: Progressing     Problem: Safety - Adult  Goal: Free from fall injury  Outcome: Progressing     Problem: Skin/Tissue Integrity  Goal: Absence of new skin breakdown  Description: 1.  Monitor for areas of redness and/or skin breakdown  2.  Assess vascular access sites hourly  3.  Every 4-6 hours minimum:  Change oxygen saturation probe site  4.  Every 4-6 hours:  If on nasal continuous positive airway pressure, respiratory therapy assess nares and determine need for appliance change or resting period.  Outcome: Progressing     Problem: ABCDS Injury Assessment  Goal: Absence of physical injury  Outcome: Progressing     Problem: Cardiovascular - Adult  Goal: Maintains optimal cardiac output and hemodynamic stability  Outcome: Progressing     Problem: Cardiovascular - Adult  Goal: Absence of cardiac dysrhythmias or at baseline  Outcome: Progressing     Problem: Metabolic/Fluid and Electrolytes - Adult  Goal: Electrolytes maintained within normal limits  Outcome: Progressing

## 2024-03-31 NOTE — PLAN OF CARE
Problem: Discharge Planning  Goal: Discharge to home or other facility with appropriate resources  3/31/2024 0742 by Cinthya Rudolph RN  Outcome: Progressing  3/30/2024 2214 by Ellie Diaz RN  Outcome: Progressing     Problem: Chronic Conditions and Co-morbidities  Goal: Patient's chronic conditions and co-morbidity symptoms are monitored and maintained or improved  3/31/2024 0742 by Cinthya Rudolph RN  Outcome: Progressing  3/30/2024 2214 by Ellie Diaz RN  Outcome: Progressing     Problem: Safety - Adult  Goal: Free from fall injury  3/31/2024 0742 by Cinthya Rudolph RN  Outcome: Progressing  3/30/2024 2214 by Ellie Diaz RN  Outcome: Progressing     Problem: Skin/Tissue Integrity  Goal: Absence of new skin breakdown  Description: 1.  Monitor for areas of redness and/or skin breakdown  2.  Assess vascular access sites hourly  3.  Every 4-6 hours minimum:  Change oxygen saturation probe site  4.  Every 4-6 hours:  If on nasal continuous positive airway pressure, respiratory therapy assess nares and determine need for appliance change or resting period.  3/31/2024 0742 by Cinthya Rudolph RN  Outcome: Progressing  3/30/2024 2214 by Ellie Diaz RN  Outcome: Progressing     Problem: ABCDS Injury Assessment  Goal: Absence of physical injury  3/31/2024 0742 by Cinthya Rudolph RN  Outcome: Progressing  3/30/2024 2214 by Ellie Diaz RN  Outcome: Progressing     Problem: Cardiovascular - Adult  Goal: Maintains optimal cardiac output and hemodynamic stability  3/31/2024 0742 by Cinthya Rudolph RN  Outcome: Progressing  3/30/2024 2214 by Ellie Diaz RN  Outcome: Progressing  Goal: Absence of cardiac dysrhythmias or at baseline  3/31/2024 0742 by Cinthya Rudolph RN  Outcome: Progressing  3/30/2024 2214 by Ellie Diaz RN  Outcome: Progressing     Problem: Metabolic/Fluid and Electrolytes - Adult  Goal: Electrolytes maintained within normal limits  3/31/2024 0742 by Cinthya Rudolph

## 2024-04-01 LAB
ABO + RH BLD: NORMAL
ANION GAP SERPL CALCULATED.3IONS-SCNC: 11 MMOL/L (ref 7–16)
ARM BAND NUMBER: NORMAL
BASOPHILS # BLD: 0.03 K/UL (ref 0–0.2)
BASOPHILS NFR BLD: 0 % (ref 0–2)
BLOOD BANK SAMPLE EXPIRATION: NORMAL
BLOOD GROUP ANTIBODIES SERPL: NEGATIVE
BUN SERPL-MCNC: 29 MG/DL (ref 6–23)
CALCIUM SERPL-MCNC: 8.5 MG/DL (ref 8.6–10.2)
CHLORIDE SERPL-SCNC: 101 MMOL/L (ref 98–107)
CO2 SERPL-SCNC: 30 MMOL/L (ref 22–29)
CREAT SERPL-MCNC: 1.6 MG/DL (ref 0.7–1.2)
ECHO BSA: 2.09 M2
EOSINOPHIL # BLD: 0.18 K/UL (ref 0.05–0.5)
EOSINOPHILS RELATIVE PERCENT: 2 % (ref 0–6)
ERYTHROCYTE [DISTWIDTH] IN BLOOD BY AUTOMATED COUNT: 13.8 % (ref 11.5–15)
GFR SERPL CREATININE-BSD FRML MDRD: 44 ML/MIN/1.73M2
GLUCOSE BLD-MCNC: 155 MG/DL (ref 74–99)
GLUCOSE BLD-MCNC: 216 MG/DL (ref 74–99)
GLUCOSE BLD-MCNC: 217 MG/DL (ref 74–99)
GLUCOSE BLD-MCNC: 238 MG/DL (ref 74–99)
GLUCOSE SERPL-MCNC: 136 MG/DL (ref 74–99)
HCT VFR BLD AUTO: 28 % (ref 37–54)
HGB BLD-MCNC: 8.9 G/DL (ref 12.5–16.5)
IMM GRANULOCYTES # BLD AUTO: 0.16 K/UL (ref 0–0.58)
IMM GRANULOCYTES NFR BLD: 2 % (ref 0–5)
LYMPHOCYTES NFR BLD: 1.46 K/UL (ref 1.5–4)
LYMPHOCYTES RELATIVE PERCENT: 14 % (ref 20–42)
MAGNESIUM SERPL-MCNC: 1.9 MG/DL (ref 1.6–2.6)
MCH RBC QN AUTO: 31.1 PG (ref 26–35)
MCHC RBC AUTO-ENTMCNC: 31.8 G/DL (ref 32–34.5)
MCV RBC AUTO: 97.9 FL (ref 80–99.9)
MONOCYTES NFR BLD: 0.79 K/UL (ref 0.1–0.95)
MONOCYTES NFR BLD: 8 % (ref 2–12)
NEUTROPHILS NFR BLD: 75 % (ref 43–80)
NEUTS SEG NFR BLD: 7.81 K/UL (ref 1.8–7.3)
PLATELET # BLD AUTO: 242 K/UL (ref 130–450)
PMV BLD AUTO: 11 FL (ref 7–12)
POTASSIUM SERPL-SCNC: 3.7 MMOL/L (ref 3.5–5)
RBC # BLD AUTO: 2.86 M/UL (ref 3.8–5.8)
SODIUM SERPL-SCNC: 142 MMOL/L (ref 132–146)
WBC OTHER # BLD: 10.4 K/UL (ref 4.5–11.5)

## 2024-04-01 PROCEDURE — 93454 CORONARY ARTERY ANGIO S&I: CPT | Performed by: INTERNAL MEDICINE

## 2024-04-01 PROCEDURE — C1769 GUIDE WIRE: HCPCS | Performed by: INTERNAL MEDICINE

## 2024-04-01 PROCEDURE — 36415 COLL VENOUS BLD VENIPUNCTURE: CPT

## 2024-04-01 PROCEDURE — 99232 SBSQ HOSP IP/OBS MODERATE 35: CPT | Performed by: INTERNAL MEDICINE

## 2024-04-01 PROCEDURE — B2111ZZ FLUOROSCOPY OF MULTIPLE CORONARY ARTERIES USING LOW OSMOLAR CONTRAST: ICD-10-PCS | Performed by: INTERNAL MEDICINE

## 2024-04-01 PROCEDURE — 2580000003 HC RX 258: Performed by: INTERNAL MEDICINE

## 2024-04-01 PROCEDURE — 6370000000 HC RX 637 (ALT 250 FOR IP): Performed by: INTERNAL MEDICINE

## 2024-04-01 PROCEDURE — 80048 BASIC METABOLIC PNL TOTAL CA: CPT

## 2024-04-01 PROCEDURE — 94640 AIRWAY INHALATION TREATMENT: CPT

## 2024-04-01 PROCEDURE — 2709999900 HC NON-CHARGEABLE SUPPLY: Performed by: INTERNAL MEDICINE

## 2024-04-01 PROCEDURE — 4A023N7 MEASUREMENT OF CARDIAC SAMPLING AND PRESSURE, LEFT HEART, PERCUTANEOUS APPROACH: ICD-10-PCS | Performed by: INTERNAL MEDICINE

## 2024-04-01 PROCEDURE — 6360000004 HC RX CONTRAST MEDICATION: Performed by: INTERNAL MEDICINE

## 2024-04-01 PROCEDURE — C1760 CLOSURE DEV, VASC: HCPCS | Performed by: INTERNAL MEDICINE

## 2024-04-01 PROCEDURE — 86900 BLOOD TYPING SEROLOGIC ABO: CPT

## 2024-04-01 PROCEDURE — 85025 COMPLETE CBC W/AUTO DIFF WBC: CPT

## 2024-04-01 PROCEDURE — 2700000000 HC OXYGEN THERAPY PER DAY

## 2024-04-01 PROCEDURE — 6370000000 HC RX 637 (ALT 250 FOR IP): Performed by: NURSE PRACTITIONER

## 2024-04-01 PROCEDURE — 2500000003 HC RX 250 WO HCPCS: Performed by: INTERNAL MEDICINE

## 2024-04-01 PROCEDURE — 86901 BLOOD TYPING SEROLOGIC RH(D): CPT

## 2024-04-01 PROCEDURE — 2580000003 HC RX 258: Performed by: NURSE PRACTITIONER

## 2024-04-01 PROCEDURE — 86850 RBC ANTIBODY SCREEN: CPT

## 2024-04-01 PROCEDURE — 6360000002 HC RX W HCPCS: Performed by: INTERNAL MEDICINE

## 2024-04-01 PROCEDURE — 2140000000 HC CCU INTERMEDIATE R&B

## 2024-04-01 PROCEDURE — C1894 INTRO/SHEATH, NON-LASER: HCPCS | Performed by: INTERNAL MEDICINE

## 2024-04-01 PROCEDURE — 82962 GLUCOSE BLOOD TEST: CPT

## 2024-04-01 PROCEDURE — 83735 ASSAY OF MAGNESIUM: CPT

## 2024-04-01 RX ORDER — SODIUM CHLORIDE 9 MG/ML
INJECTION, SOLUTION INTRAVENOUS PRN
Status: DISCONTINUED | OUTPATIENT
Start: 2024-04-01 | End: 2024-04-02 | Stop reason: HOSPADM

## 2024-04-01 RX ORDER — FENTANYL CITRATE 50 UG/ML
INJECTION, SOLUTION INTRAMUSCULAR; INTRAVENOUS PRN
Status: DISCONTINUED | OUTPATIENT
Start: 2024-04-01 | End: 2024-04-01 | Stop reason: HOSPADM

## 2024-04-01 RX ORDER — ASPIRIN 81 MG/1
81 TABLET, CHEWABLE ORAL ONCE
Status: COMPLETED | OUTPATIENT
Start: 2024-04-01 | End: 2024-04-01

## 2024-04-01 RX ORDER — SODIUM CHLORIDE 9 MG/ML
INJECTION, SOLUTION INTRAVENOUS CONTINUOUS
Status: ACTIVE | OUTPATIENT
Start: 2024-04-01 | End: 2024-04-02

## 2024-04-01 RX ORDER — SODIUM CHLORIDE 0.9 % (FLUSH) 0.9 %
5-40 SYRINGE (ML) INJECTION PRN
Status: DISCONTINUED | OUTPATIENT
Start: 2024-04-01 | End: 2024-04-02 | Stop reason: HOSPADM

## 2024-04-01 RX ORDER — ACETAMINOPHEN 325 MG/1
650 TABLET ORAL EVERY 4 HOURS PRN
Status: DISCONTINUED | OUTPATIENT
Start: 2024-04-01 | End: 2024-04-02 | Stop reason: HOSPADM

## 2024-04-01 RX ORDER — VERAPAMIL HYDROCHLORIDE 2.5 MG/ML
INJECTION, SOLUTION INTRAVENOUS PRN
Status: DISCONTINUED | OUTPATIENT
Start: 2024-04-01 | End: 2024-04-01 | Stop reason: HOSPADM

## 2024-04-01 RX ORDER — HEPARIN SODIUM 10000 [USP'U]/ML
INJECTION, SOLUTION INTRAVENOUS; SUBCUTANEOUS PRN
Status: DISCONTINUED | OUTPATIENT
Start: 2024-04-01 | End: 2024-04-01 | Stop reason: HOSPADM

## 2024-04-01 RX ORDER — SODIUM CHLORIDE 0.9 % (FLUSH) 0.9 %
5-40 SYRINGE (ML) INJECTION EVERY 12 HOURS SCHEDULED
Status: DISCONTINUED | OUTPATIENT
Start: 2024-04-01 | End: 2024-04-02 | Stop reason: HOSPADM

## 2024-04-01 RX ADMIN — PETROLATUM: 420 OINTMENT TOPICAL at 20:32

## 2024-04-01 RX ADMIN — PREDNISONE 20 MG: 20 TABLET ORAL at 08:59

## 2024-04-01 RX ADMIN — PETROLATUM: 420 OINTMENT TOPICAL at 08:59

## 2024-04-01 RX ADMIN — IPRATROPIUM BROMIDE AND ALBUTEROL SULFATE 1 DOSE: 2.5; .5 SOLUTION RESPIRATORY (INHALATION) at 16:40

## 2024-04-01 RX ADMIN — BUDESONIDE INHALATION 500 MCG: 0.5 SUSPENSION RESPIRATORY (INHALATION) at 18:49

## 2024-04-01 RX ADMIN — SODIUM CHLORIDE, PRESERVATIVE FREE 10 ML: 5 INJECTION INTRAVENOUS at 20:32

## 2024-04-01 RX ADMIN — METOPROLOL SUCCINATE 50 MG: 50 TABLET, FILM COATED, EXTENDED RELEASE ORAL at 20:32

## 2024-04-01 RX ADMIN — FERROUS SULFATE TAB 325 MG (65 MG ELEMENTAL FE) 325 MG: 325 (65 FE) TAB at 08:59

## 2024-04-01 RX ADMIN — SODIUM CHLORIDE, PRESERVATIVE FREE 10 ML: 5 INJECTION INTRAVENOUS at 09:00

## 2024-04-01 RX ADMIN — SODIUM CHLORIDE: 9 INJECTION, SOLUTION INTRAVENOUS at 16:14

## 2024-04-01 RX ADMIN — ASPIRIN 81 MG: 81 TABLET, CHEWABLE ORAL at 14:28

## 2024-04-01 RX ADMIN — BUDESONIDE INHALATION 500 MCG: 0.5 SUSPENSION RESPIRATORY (INHALATION) at 10:51

## 2024-04-01 RX ADMIN — ARFORMOTEROL TARTRATE 15 MCG: 15 SOLUTION RESPIRATORY (INHALATION) at 18:50

## 2024-04-01 RX ADMIN — ARFORMOTEROL TARTRATE 15 MCG: 15 SOLUTION RESPIRATORY (INHALATION) at 10:52

## 2024-04-01 RX ADMIN — LOSARTAN POTASSIUM 25 MG: 25 TABLET, FILM COATED ORAL at 08:59

## 2024-04-01 RX ADMIN — DOXAZOSIN 2 MG: 2 TABLET ORAL at 08:59

## 2024-04-01 RX ADMIN — ASPIRIN 81 MG: 81 TABLET, CHEWABLE ORAL at 20:32

## 2024-04-01 RX ADMIN — POTASSIUM CHLORIDE 20 MEQ: 1500 TABLET, EXTENDED RELEASE ORAL at 08:59

## 2024-04-01 RX ADMIN — IPRATROPIUM BROMIDE AND ALBUTEROL SULFATE 1 DOSE: 2.5; .5 SOLUTION RESPIRATORY (INHALATION) at 10:52

## 2024-04-01 RX ADMIN — SODIUM CHLORIDE, PRESERVATIVE FREE 10 ML: 5 INJECTION INTRAVENOUS at 20:33

## 2024-04-01 RX ADMIN — IPRATROPIUM BROMIDE AND ALBUTEROL SULFATE 1 DOSE: 2.5; .5 SOLUTION RESPIRATORY (INHALATION) at 18:50

## 2024-04-01 RX ADMIN — GUAIFENESIN 400 MG: 400 TABLET ORAL at 08:59

## 2024-04-01 RX ADMIN — AMIODARONE HYDROCHLORIDE 200 MG: 200 TABLET ORAL at 08:59

## 2024-04-01 RX ADMIN — METOPROLOL SUCCINATE 50 MG: 50 TABLET, FILM COATED, EXTENDED RELEASE ORAL at 08:59

## 2024-04-01 RX ADMIN — GUAIFENESIN 400 MG: 400 TABLET ORAL at 20:32

## 2024-04-01 RX ADMIN — FOLIC ACID 1 MG: 1 TABLET ORAL at 08:58

## 2024-04-01 RX ADMIN — INSULIN GLARGINE 5 UNITS: 100 INJECTION, SOLUTION SUBCUTANEOUS at 20:32

## 2024-04-01 RX ADMIN — FUROSEMIDE 40 MG: 40 TABLET ORAL at 08:59

## 2024-04-01 NOTE — CARE COORDINATION
4/1:  Update CM Note: Pt was a transfer from [place] for a NSTEMI from [place].  Pt is 2.5 L/NC at 98% & Po Eliquis-New Medication.  Pt's was for a heart cath today but pt ate breakfast.  CM waiting to hear back from RN whether cath can be done today.  Pt's dc plan is to [facility].  Per Nancy Morrell per cert good until tomorrow will need update PT/OT notes.  Cm verified withJAne that pre-cert is good until 4/3 at 1159pm & doesn't need updated PT/OT notes until then.  CM faxed COVID test, PASRR & code status to [phone].  SVETLANA.Ambulette, PASRR form completed & placed in envelope in soft chart.  Sw/CM will continue to follow.  Electronically signed by Mary Lane RN on 4/1/2024 at 10:23 AM

## 2024-04-01 NOTE — PLAN OF CARE
Upon seeing the patient this morning, the patient was eating breakfast and had a tray in front of him. He was to be NPO for cath today. Notified CM and bedside nurse. Will notify Cardiology.     Electronically signed by Dee Dee Lopez MD on 4/1/2024 at 10:02 AM

## 2024-04-01 NOTE — PLAN OF CARE
Problem: Discharge Planning  Goal: Discharge to home or other facility with appropriate resources  3/31/2024 2003 by Francoise Peña RN  Outcome: Progressing  3/31/2024 1203 by Cinthya Rudolph RN  Outcome: Progressing  3/31/2024 0742 by Cinthya Rudolph RN  Outcome: Progressing     Problem: Chronic Conditions and Co-morbidities  Goal: Patient's chronic conditions and co-morbidity symptoms are monitored and maintained or improved  3/31/2024 2003 by Francoise Peña RN  Outcome: Progressing  3/31/2024 1203 by Cinthya Rudolph RN  Outcome: Progressing  3/31/2024 0742 by Cinthya Rudolph RN  Outcome: Progressing     Problem: Safety - Adult  Goal: Free from fall injury  3/31/2024 1203 by Cinthya Rudolph RN  Outcome: Progressing  3/31/2024 0742 by Cinthya Rudolph RN  Outcome: Progressing     Problem: Skin/Tissue Integrity  Goal: Absence of new skin breakdown  Description: 1.  Monitor for areas of redness and/or skin breakdown  2.  Assess vascular access sites hourly  3.  Every 4-6 hours minimum:  Change oxygen saturation probe site  4.  Every 4-6 hours:  If on nasal continuous positive airway pressure, respiratory therapy assess nares and determine need for appliance change or resting period.  3/31/2024 1203 by Cinthya Rudolph RN  Outcome: Progressing  3/31/2024 0742 by Cinthya Rudolph RN  Outcome: Progressing     Problem: ABCDS Injury Assessment  Goal: Absence of physical injury  3/31/2024 1203 by Cinthya Rudolph RN  Outcome: Progressing  3/31/2024 0742 by Cinthya Rudolph RN  Outcome: Progressing     Problem: Cardiovascular - Adult  Goal: Maintains optimal cardiac output and hemodynamic stability  3/31/2024 1203 by Cinthya Rudolph RN  Outcome: Progressing  3/31/2024 0742 by Cinthya Rudolph RN  Outcome: Progressing  Goal: Absence of cardiac dysrhythmias or at baseline  3/31/2024 1203 by Cinthya Rudolph RN  Outcome: Progressing  3/31/2024 0742 by Ronni

## 2024-04-01 NOTE — PATIENT CARE CONFERENCE
P Quality Flow/Interdisciplinary Rounds Progress Note        Quality Flow Rounds held on April 1, 2024    Disciplines Attending:  Bedside Nurse, , , and Nursing Unit Leadership    Luis Silva was admitted on 3/26/2024  9:03 PM    Anticipated Discharge Date:       Disposition:    Les Score:  Les Scale Score: 17    Readmission Risk              Risk of Unplanned Readmission:  19           Discussed patient goal for the day, patient clinical progression, and barriers to discharge.  The following Goal(s) of the Day/Commitment(s) have been identified:  discharge plan      Staci Jimenez RN  April 1, 2024

## 2024-04-01 NOTE — PLAN OF CARE
Problem: Discharge Planning  Goal: Discharge to home or other facility with appropriate resources  4/1/2024 1956 by Francoise Peña RN  Outcome: Progressing  4/1/2024 1956 by Francoise Peña RN  Outcome: Progressing     Problem: Chronic Conditions and Co-morbidities  Goal: Patient's chronic conditions and co-morbidity symptoms are monitored and maintained or improved  4/1/2024 1956 by Francoise Peña RN  Outcome: Progressing  4/1/2024 1956 by Francoise Peña RN  Outcome: Progressing     Problem: Chronic Conditions and Co-morbidities  Goal: Patient's chronic conditions and co-morbidity symptoms are monitored and maintained or improved  4/1/2024 1956 by Francoise Peña RN  Outcome: Progressing  4/1/2024 1956 by Francoise Peña RN  Outcome: Progressing

## 2024-04-02 VITALS
RESPIRATION RATE: 18 BRPM | TEMPERATURE: 96.5 F | SYSTOLIC BLOOD PRESSURE: 94 MMHG | HEIGHT: 68 IN | HEART RATE: 53 BPM | WEIGHT: 190.3 LBS | OXYGEN SATURATION: 92 % | DIASTOLIC BLOOD PRESSURE: 56 MMHG | BODY MASS INDEX: 28.84 KG/M2

## 2024-04-02 LAB
ANION GAP SERPL CALCULATED.3IONS-SCNC: 9 MMOL/L (ref 7–16)
BNP SERPL-MCNC: 1651 PG/ML (ref 0–450)
BUN SERPL-MCNC: 28 MG/DL (ref 6–23)
CALCIUM SERPL-MCNC: 8.1 MG/DL (ref 8.6–10.2)
CHLORIDE SERPL-SCNC: 103 MMOL/L (ref 98–107)
CO2 SERPL-SCNC: 25 MMOL/L (ref 22–29)
CREAT SERPL-MCNC: 1.2 MG/DL (ref 0.7–1.2)
GFR SERPL CREATININE-BSD FRML MDRD: 59 ML/MIN/1.73M2
GLUCOSE BLD-MCNC: 175 MG/DL (ref 74–99)
GLUCOSE BLD-MCNC: 303 MG/DL (ref 74–99)
GLUCOSE SERPL-MCNC: 158 MG/DL (ref 74–99)
MAGNESIUM SERPL-MCNC: 1.7 MG/DL (ref 1.6–2.6)
POTASSIUM SERPL-SCNC: 3.7 MMOL/L (ref 3.5–5)
SODIUM SERPL-SCNC: 137 MMOL/L (ref 132–146)

## 2024-04-02 PROCEDURE — 83880 ASSAY OF NATRIURETIC PEPTIDE: CPT

## 2024-04-02 PROCEDURE — 6370000000 HC RX 637 (ALT 250 FOR IP): Performed by: NURSE PRACTITIONER

## 2024-04-02 PROCEDURE — 83735 ASSAY OF MAGNESIUM: CPT

## 2024-04-02 PROCEDURE — 82962 GLUCOSE BLOOD TEST: CPT

## 2024-04-02 PROCEDURE — 6370000000 HC RX 637 (ALT 250 FOR IP): Performed by: INTERNAL MEDICINE

## 2024-04-02 PROCEDURE — 36415 COLL VENOUS BLD VENIPUNCTURE: CPT

## 2024-04-02 PROCEDURE — 2580000003 HC RX 258: Performed by: INTERNAL MEDICINE

## 2024-04-02 PROCEDURE — 6360000002 HC RX W HCPCS: Performed by: INTERNAL MEDICINE

## 2024-04-02 PROCEDURE — 94640 AIRWAY INHALATION TREATMENT: CPT

## 2024-04-02 PROCEDURE — 80048 BASIC METABOLIC PNL TOTAL CA: CPT

## 2024-04-02 RX ORDER — METOPROLOL SUCCINATE 50 MG/1
50 TABLET, EXTENDED RELEASE ORAL 2 TIMES DAILY
Qty: 30 TABLET | Refills: 3
Start: 2024-04-02

## 2024-04-02 RX ORDER — DOXAZOSIN 2 MG/1
2 TABLET ORAL DAILY
Qty: 30 TABLET | Refills: 3
Start: 2024-04-03

## 2024-04-02 RX ORDER — AMIODARONE HYDROCHLORIDE 200 MG/1
200 TABLET ORAL DAILY
Qty: 30 TABLET | Refills: 0
Start: 2024-04-03 | End: 2024-05-03

## 2024-04-02 RX ORDER — FOLIC ACID 1 MG/1
1 TABLET ORAL DAILY
Qty: 30 TABLET | Refills: 3
Start: 2024-04-03

## 2024-04-02 RX ORDER — LOSARTAN POTASSIUM 25 MG/1
25 TABLET ORAL DAILY
Qty: 30 TABLET | Refills: 3
Start: 2024-04-03

## 2024-04-02 RX ORDER — OMEPRAZOLE 20 MG/1
40 CAPSULE, DELAYED RELEASE ORAL DAILY
Qty: 30 CAPSULE | Refills: 3
Start: 2024-04-02

## 2024-04-02 RX ORDER — INSULIN GLARGINE 100 [IU]/ML
7 INJECTION, SOLUTION SUBCUTANEOUS NIGHTLY
Qty: 10 ML | Refills: 3
Start: 2024-04-02

## 2024-04-02 RX ORDER — LOSARTAN POTASSIUM 25 MG/1
25 TABLET ORAL DAILY
Status: DISCONTINUED | OUTPATIENT
Start: 2024-04-02 | End: 2024-04-02 | Stop reason: HOSPADM

## 2024-04-02 RX ORDER — FUROSEMIDE 40 MG/1
40 TABLET ORAL DAILY
Status: DISCONTINUED | OUTPATIENT
Start: 2024-04-03 | End: 2024-04-02 | Stop reason: HOSPADM

## 2024-04-02 RX ORDER — PANTOPRAZOLE SODIUM 40 MG/1
40 TABLET, DELAYED RELEASE ORAL
Status: DISCONTINUED | OUTPATIENT
Start: 2024-04-03 | End: 2024-04-02 | Stop reason: HOSPADM

## 2024-04-02 RX ORDER — POTASSIUM CHLORIDE 20 MEQ/1
20 TABLET, EXTENDED RELEASE ORAL
Qty: 60 TABLET | Refills: 3
Start: 2024-04-03

## 2024-04-02 RX ORDER — IPRATROPIUM BROMIDE AND ALBUTEROL SULFATE 2.5; .5 MG/3ML; MG/3ML
3 SOLUTION RESPIRATORY (INHALATION) EVERY 4 HOURS PRN
Qty: 360 ML | Refills: 0
Start: 2024-04-02 | End: 2024-05-02

## 2024-04-02 RX ORDER — BUDESONIDE 0.5 MG/2ML
0.5 INHALANT ORAL
Qty: 60 EACH | Refills: 3 | Status: SHIPPED | OUTPATIENT
Start: 2024-04-02

## 2024-04-02 RX ORDER — ASPIRIN 81 MG/1
81 TABLET, CHEWABLE ORAL DAILY
Qty: 30 TABLET | Refills: 3 | Status: SHIPPED | OUTPATIENT
Start: 2024-04-02

## 2024-04-02 RX ORDER — FERROUS SULFATE 325(65) MG
325 TABLET ORAL
Qty: 30 TABLET | Refills: 3
Start: 2024-04-03

## 2024-04-02 RX ORDER — FUROSEMIDE 40 MG/1
40 TABLET ORAL DAILY
Qty: 60 TABLET | Refills: 3
Start: 2024-04-03

## 2024-04-02 RX ORDER — ARFORMOTEROL TARTRATE 15 UG/2ML
15 SOLUTION RESPIRATORY (INHALATION)
Qty: 120 ML | Refills: 3 | Status: SHIPPED | OUTPATIENT
Start: 2024-04-02

## 2024-04-02 RX ADMIN — BUDESONIDE INHALATION 500 MCG: 0.5 SUSPENSION RESPIRATORY (INHALATION) at 07:31

## 2024-04-02 RX ADMIN — FERROUS SULFATE TAB 325 MG (65 MG ELEMENTAL FE) 325 MG: 325 (65 FE) TAB at 08:34

## 2024-04-02 RX ADMIN — FOLIC ACID 1 MG: 1 TABLET ORAL at 08:33

## 2024-04-02 RX ADMIN — POTASSIUM CHLORIDE 20 MEQ: 1500 TABLET, EXTENDED RELEASE ORAL at 08:34

## 2024-04-02 RX ADMIN — PETROLATUM: 420 OINTMENT TOPICAL at 08:43

## 2024-04-02 RX ADMIN — ARFORMOTEROL TARTRATE 15 MCG: 15 SOLUTION RESPIRATORY (INHALATION) at 07:31

## 2024-04-02 RX ADMIN — AMIODARONE HYDROCHLORIDE 200 MG: 200 TABLET ORAL at 08:34

## 2024-04-02 RX ADMIN — APIXABAN 5 MG: 5 TABLET, FILM COATED ORAL at 08:43

## 2024-04-02 RX ADMIN — SODIUM CHLORIDE, PRESERVATIVE FREE 10 ML: 5 INJECTION INTRAVENOUS at 08:34

## 2024-04-02 RX ADMIN — DOXAZOSIN 2 MG: 2 TABLET ORAL at 08:34

## 2024-04-02 RX ADMIN — GUAIFENESIN 400 MG: 400 TABLET ORAL at 08:34

## 2024-04-02 RX ADMIN — INSULIN LISPRO 3 UNITS: 100 INJECTION, SOLUTION INTRAVENOUS; SUBCUTANEOUS at 11:38

## 2024-04-02 RX ADMIN — IPRATROPIUM BROMIDE AND ALBUTEROL SULFATE 1 DOSE: 2.5; .5 SOLUTION RESPIRATORY (INHALATION) at 11:17

## 2024-04-02 RX ADMIN — LOSARTAN POTASSIUM 25 MG: 25 TABLET, FILM COATED ORAL at 08:33

## 2024-04-02 RX ADMIN — IPRATROPIUM BROMIDE AND ALBUTEROL SULFATE 1 DOSE: 2.5; .5 SOLUTION RESPIRATORY (INHALATION) at 07:31

## 2024-04-02 NOTE — CARE COORDINATION
Cardiology cleared for discharge. Message sent to attending to check if stable for discharge to [facility] today. Spoke with facility liaison and notified of possible discharge later today. She is stating today is the last day of his precert. Ambulette on soft chart.     [facility] arranged for 1430 today, changed to ambulance due to intermittent confusion. Message left with spouse about an hour ago and again just now regarding discharge today. Facility notified of discharge at 1430.     For questions I can be reached at [phone]. DERRICK Penn

## 2024-04-02 NOTE — PROGRESS NOTES
[facility] Hospitalist Progress Note    Admitting Date and Time: 3/26/2024  9:03 PM  Admit Dx: Acute congestive heart failure, unspecified heart failure type (HCC) [I50.9]  Patient is an 83-year-old male with past medical history of A-fib, hypertension, CKD stage IIIa, history of DVT, COPD.  Came in with shortness of breath and lower extremity swelling, elevated troponin and BNP, bilateral effusions and transferred to [facility] due to insurance.  Poor historian, had stopped his Lasix, unclear if he was following with cardiology outpatient.    Subjective:  Patient is being followed for Acute congestive heart failure, unspecified heart failure type (HCC) [I50.9]   Patient seen and examined.  No acute events overnight  On 2L NC      ROS: denies fever, chills, cp, sob, n/v, HA unless stated above.      guaiFENesin  400 mg Oral TID    insulin glargine  5 Units SubCUTAneous Nightly    amiodarone  200 mg Oral BID    metoprolol succinate  50 mg Oral BID    furosemide  40 mg Oral BID    potassium chloride  20 mEq Oral Daily with breakfast    predniSONE  20 mg Oral Daily    folic acid  1 mg Oral Daily    budesonide  0.5 mg Nebulization BID RT    arformoterol tartrate  15 mcg Nebulization BID RT    ipratropium 0.5 mg-albuterol 2.5 mg  1 Dose Inhalation Q4H WA RT    white petrolatum   Topical BID    [Held by provider] apixaban  5 mg Oral BID    ferrous sulfate  325 mg Oral Daily with breakfast    losartan  25 mg Oral Daily    doxazosin  2 mg Oral Daily    aspirin  81 mg Oral Daily    sodium chloride flush  5-40 mL IntraVENous 2 times per day    insulin lispro  0-4 Units SubCUTAneous TID WC    insulin lispro  0-4 Units SubCUTAneous Nightly     perflutren lipid microspheres, 1.5 mL, ONCE PRN  sodium chloride flush, 5-40 mL, PRN  sodium chloride, , PRN  ondansetron, 4 mg, Q8H PRN   Or  ondansetron, 4 mg, Q6H PRN  polyethylene glycol, 17 g, Daily PRN  acetaminophen, 650 mg, Q6H PRN   Or  acetaminophen, 650 mg, Q6H PRN  glucose, 
       [facility] Hospitalist Progress Note    Admitting Date and Time: 3/26/2024  9:03 PM  Admit Dx: Acute congestive heart failure, unspecified heart failure type (HCC) [I50.9]  Patient is an 83-year-old male with past medical history of A-fib, hypertension, CKD stage IIIa, history of DVT, COPD.  Came in with shortness of breath and lower extremity swelling, elevated troponin and BNP, bilateral effusions and transferred to [facility] due to insurance.  Poor historian, had stopped his Lasix, unclear if he was following with cardiology outpatient.    Subjective:  Patient is being followed for Acute congestive heart failure, unspecified heart failure type (HCC) [I50.9]   Patient seen and examined.  No acute events overnight  Feels better.   NPO for stress test today.     ROS: denies fever, chills, cp, sob, n/v, HA unless stated above.      [START ON 3/30/2024] furosemide  40 mg Oral BID    [START ON 3/30/2024] potassium chloride  20 mEq Oral Daily with breakfast    guaiFENesin  400 mg Oral TID    azithromycin (ZITHROMAX) 500 mg in sodium chloride 0.9 % 250 mL IVPB  500 mg IntraVENous Q24H    predniSONE  20 mg Oral Daily    folic acid  1 mg Oral Daily    budesonide  0.5 mg Nebulization BID RT    arformoterol tartrate  15 mcg Nebulization BID RT    ipratropium 0.5 mg-albuterol 2.5 mg  1 Dose Inhalation Q4H WA RT    white petrolatum   Topical BID    apixaban  5 mg Oral BID    ferrous sulfate  325 mg Oral Daily with breakfast    losartan  25 mg Oral Daily    amiodarone  200 mg Oral Daily    dilTIAZem  120 mg Oral Daily    doxazosin  2 mg Oral Daily    aspirin  81 mg Oral Daily    sodium chloride flush  5-40 mL IntraVENous 2 times per day    furosemide  40 mg IntraVENous BID    insulin lispro  0-4 Units SubCUTAneous TID WC    insulin lispro  0-4 Units SubCUTAneous Nightly     sodium chloride flush, 5-40 mL, PRN  sodium chloride, , PRN  ondansetron, 4 mg, Q8H PRN   Or  ondansetron, 4 mg, Q6H PRN  polyethylene glycol, 17 g, 
       [facility] Hospitalist Progress Note    Admitting Date and Time: 3/26/2024  9:03 PM  Admit Dx: CHF / NSTEMI  Patient is an 83-year-old male with past medical history of A-fib, hypertension, CKD stage IIIa, history of DVT, COPD.  Came in with shortness of breath and lower extremity swelling, elevated troponin and BNP, bilateral effusions and transferred to [facility] due to insurance.  Poor historian, had stopped his Lasix, unclear if he was following with cardiology outpatient.    Subjective:  Patient is being followed for CHF / NSTEMI   Patient seen and examined.  No acute events overnight    ROS: denies fever, chills, cp, sob, n/v, HA unless stated above.      white petrolatum   Topical BID    magnesium sulfate  2,000 mg IntraVENous Once    budesonide  0.5 mg Nebulization BID RT    arformoterol tartrate  15 mcg Nebulization BID RT    ipratropium 0.5 mg-albuterol 2.5 mg  1 Dose Inhalation Q4H WA RT    apixaban  5 mg Oral BID    ferrous sulfate  325 mg Oral Daily with breakfast    losartan  25 mg Oral Daily    amiodarone  200 mg Oral Daily    dilTIAZem  120 mg Oral Daily    doxazosin  2 mg Oral Daily    aspirin  81 mg Oral Daily    sodium chloride flush  5-40 mL IntraVENous 2 times per day    furosemide  40 mg IntraVENous BID    insulin lispro  0-4 Units SubCUTAneous TID WC    insulin lispro  0-4 Units SubCUTAneous Nightly     sodium chloride flush, 5-40 mL, PRN  sodium chloride, , PRN  ondansetron, 4 mg, Q8H PRN   Or  ondansetron, 4 mg, Q6H PRN  polyethylene glycol, 17 g, Daily PRN  acetaminophen, 650 mg, Q6H PRN   Or  acetaminophen, 650 mg, Q6H PRN  potassium chloride, 40 mEq, PRN   Or  potassium alternative oral replacement, 40 mEq, PRN   Or  potassium chloride, 10 mEq, PRN  magnesium sulfate, 2,000 mg, PRN  glucose, 4 tablet, PRN  dextrose bolus, 125 mL, PRN   Or  dextrose bolus, 250 mL, PRN  glucagon (rDNA), 1 mg, PRN  dextrose, , Continuous PRN  ipratropium 0.5 mg-albuterol 2.5 mg, 1 Dose, Q4H PRN     
       [facility] Hospitalist Progress Note    Admitting Date and Time: 3/26/2024  9:03 PM  Admit Dx: Acute congestive heart failure, unspecified heart failure type (HCC) [I50.9]  Patient is an 83-year-old male with past medical history of A-fib, hypertension, CKD stage IIIa, history of DVT, COPD.  Came in with shortness of breath and lower extremity swelling, elevated troponin and BNP, bilateral effusions and transferred to [facility] due to insurance.  Poor historian, had stopped his Lasix, unclear if he was following with cardiology outpatient.    Subjective:  Patient is being followed for Acute congestive heart failure, unspecified heart failure type (HCC) [I50.9]   Patient seen and examined.  No acute events overnight  Feels better.   Off oxygen this morning.      ROS: denies fever, chills, cp, sob, n/v, HA unless stated above.      insulin glargine  5 Units SubCUTAneous Nightly    furosemide  40 mg Oral BID    potassium chloride  20 mEq Oral Daily with breakfast    guaiFENesin  400 mg Oral TID    azithromycin (ZITHROMAX) 500 mg in sodium chloride 0.9 % 250 mL IVPB  500 mg IntraVENous Q24H    predniSONE  20 mg Oral Daily    folic acid  1 mg Oral Daily    budesonide  0.5 mg Nebulization BID RT    arformoterol tartrate  15 mcg Nebulization BID RT    ipratropium 0.5 mg-albuterol 2.5 mg  1 Dose Inhalation Q4H WA RT    white petrolatum   Topical BID    apixaban  5 mg Oral BID    ferrous sulfate  325 mg Oral Daily with breakfast    losartan  25 mg Oral Daily    amiodarone  200 mg Oral Daily    dilTIAZem  120 mg Oral Daily    doxazosin  2 mg Oral Daily    aspirin  81 mg Oral Daily    sodium chloride flush  5-40 mL IntraVENous 2 times per day    insulin lispro  0-4 Units SubCUTAneous TID WC    insulin lispro  0-4 Units SubCUTAneous Nightly     perflutren lipid microspheres, 1.5 mL, ONCE PRN  sodium chloride flush, 5-40 mL, PRN  sodium chloride, , PRN  ondansetron, 4 mg, Q8H PRN   Or  ondansetron, 4 mg, Q6H 
       [facility] Hospitalist Progress Note    Admitting Date and Time: 3/26/2024  9:03 PM  Admit Dx: Acute congestive heart failure, unspecified heart failure type (HCC) [I50.9]  Patient is an 83-year-old male with past medical history of A-fib, hypertension, CKD stage IIIa, history of DVT, COPD.  Came in with shortness of breath and lower extremity swelling, elevated troponin and BNP, bilateral effusions and transferred to [facility] due to insurance.  Poor historian, had stopped his Lasix, unclear if he was following with cardiology outpatient.    Subjective:  Patient is being followed for Acute congestive heart failure, unspecified heart failure type (HCC) [I50.9]   Patient seen and examined.  No acute events overnight  Productive cough.     ROS: denies fever, chills, cp, sob, n/v, HA unless stated above.      guaiFENesin  400 mg Oral TID    azithromycin (ZITHROMAX) 500 mg in sodium chloride 0.9 % 250 mL IVPB  500 mg IntraVENous Q24H    predniSONE  20 mg Oral Daily    folic acid  1 mg Oral Daily    budesonide  0.5 mg Nebulization BID RT    arformoterol tartrate  15 mcg Nebulization BID RT    ipratropium 0.5 mg-albuterol 2.5 mg  1 Dose Inhalation Q4H WA RT    white petrolatum   Topical BID    apixaban  5 mg Oral BID    ferrous sulfate  325 mg Oral Daily with breakfast    losartan  25 mg Oral Daily    amiodarone  200 mg Oral Daily    dilTIAZem  120 mg Oral Daily    doxazosin  2 mg Oral Daily    aspirin  81 mg Oral Daily    sodium chloride flush  5-40 mL IntraVENous 2 times per day    furosemide  40 mg IntraVENous BID    insulin lispro  0-4 Units SubCUTAneous TID WC    insulin lispro  0-4 Units SubCUTAneous Nightly     sodium chloride flush, 5-40 mL, PRN  sodium chloride, , PRN  ondansetron, 4 mg, Q8H PRN   Or  ondansetron, 4 mg, Q6H PRN  polyethylene glycol, 17 g, Daily PRN  acetaminophen, 650 mg, Q6H PRN   Or  acetaminophen, 650 mg, Q6H PRN  potassium chloride, 40 mEq, PRN   Or  potassium alternative oral 
    INPATIENT CARDIOLOGY FOLLOW-UP    Name: Luis Silva    Age: 83 y.o.    Date of Admission: 3/26/2024  9:03 PM    Date of Service: 3/28/2024    Primary Cardiologist: None, known to me from this admission    Chief Complaint: Follow-up for CHF, PAF    Interim History:  Some shortness of breath.  Denies chest pain.  Continues to go in and out of A-fib.  Currently sinus    Review of Systems:   Negative except as described above    Problem List:  Patient Active Problem List   Diagnosis    Acute systolic congestive heart failure (HCC)    Elevated troponin    Chronic atrial fibrillation (HCC)    Type 2 diabetes mellitus with kidney complication, with long-term current use of insulin (HCC)    Primary hypertension    Stage 3a chronic kidney disease (HCC)    Mixed simple and mucopurulent chronic bronchitis (HCC)    Acute congestive heart failure (HCC)    Acute congestive heart failure, unspecified heart failure type (HCC)    PAF (paroxysmal atrial fibrillation) (HCC)    Swelling of lower extremity    Anemia    Encounter for screening involving social determinants of health (SDoH)       Current Medications:    Current Facility-Administered Medications:     guaiFENesin tablet 400 mg, 400 mg, Oral, TID, Dee Dee Lopez MD    azithromycin (ZITHROMAX) 500 mg in sodium chloride 0.9 % 250 mL IVPB, 500 mg, IntraVENous, Q24H, Dee Dee Lopez MD    predniSONE (DELTASONE) tablet 20 mg, 20 mg, Oral, Daily, Dee Dee Lopez MD    folic acid (FOLVITE) tablet 1 mg, 1 mg, Oral, Daily, Dee Dee Lopez MD    budesonide (PULMICORT) nebulizer suspension 500 mcg, 0.5 mg, Nebulization, BID RT, Dee Dee Lopez MD, 500 mcg at 03/28/24 0802    arformoterol tartrate (BROVANA) nebulizer solution 15 mcg, 15 mcg, Nebulization, BID John ZAMORA Suzanne E, MD, 15 mcg at 03/28/24 0802    ipratropium 0.5 mg-albuterol 2.5 mg (DUONEB) nebulizer solution 1 Dose, 1 Dose, Inhalation, Q4H WA RTJohn Suzanne E, MD, 1 Dose at 
    INPATIENT CARDIOLOGY FOLLOW-UP    Name: Luis Silva    Age: 83 y.o.    Date of Admission: 3/26/2024  9:03 PM    Date of Service: 3/29/2024    Primary Cardiologist: None, known to me from this admission    Chief Complaint: Follow-up for CHF, PAF    Interim History:  Feels better.  Breathing improved.  Denies chest pain.  Continues to go in and out of A-fib.  Currently A-fib 90s.  Hemoglobin 8.7 today.    Has diuresed well net -5.3 L    Review of Systems:   Negative except as described above    Problem List:  Patient Active Problem List   Diagnosis    Acute systolic congestive heart failure (HCC)    Elevated troponin    Chronic atrial fibrillation (HCC)    Type 2 diabetes mellitus with kidney complication, with long-term current use of insulin (HCC)    Primary hypertension    Stage 3a chronic kidney disease (HCC)    Mixed simple and mucopurulent chronic bronchitis (HCC)    Acute congestive heart failure (HCC)    Acute congestive heart failure, unspecified heart failure type (HCC)    PAF (paroxysmal atrial fibrillation) (HCC)    Swelling of lower extremity    Anemia    Encounter for screening involving social determinants of health (SDoH)       Current Medications:    Current Facility-Administered Medications:     technetium sestamibi (CARDIOLITE) injection 30 millicurie, 30 millicurie, IntraVENous, ONCE PRN, Joe Obando MD    guaiFENesin tablet 400 mg, 400 mg, Oral, TID, Dee Dee Lopez MD, 400 mg at 03/29/24 0917    azithromycin (ZITHROMAX) 500 mg in sodium chloride 0.9 % 250 mL IVPB, 500 mg, IntraVENous, Q24H, Dee Dee Lopez MD, Stopped at 03/28/24 1734    predniSONE (DELTASONE) tablet 20 mg, 20 mg, Oral, Daily, Dee Dee Lopez MD, 20 mg at 03/29/24 0917    folic acid (FOLVITE) tablet 1 mg, 1 mg, Oral, Daily, Dee Dee Lopez MD, 1 mg at 03/29/24 0916    budesonide (PULMICORT) nebulizer suspension 500 mcg, 0.5 mg, Nebulization, BID RT, Dee Dee Lopez MD, 500 mcg at 03/29/24 0802   
  [facility] Quality Flow/Interdisciplinary Rounds Progress Note        Quality Flow Rounds held on March 27, 2024    Disciplines Attending:  Bedside Nurse, , , and Nursing Unit Leadership    Luis Silva was admitted on 3/26/2024  9:03 PM    Anticipated Discharge Date:       Disposition:    Les Score:  Les Scale Score: 18    Readmission Risk              Risk of Unplanned Readmission:  15           Discussed patient goal for the day, patient clinical progression, and barriers to discharge.  The following Goal(s) of the Day/Commitment(s) have been identified:  Diagnostics - Report Results and Labs - Report Results      Polina Peace RN  March 27, 2024        
  P Quality Flow/Interdisciplinary Rounds Progress Note        Quality Flow Rounds held on March 31, 2024    Disciplines Attending:  Bedside Nurse, , , and Nursing Unit Leadership    Luis Silva was admitted on 3/26/2024  9:03 PM    Anticipated Discharge Date:       Disposition:    Les Score:  Les Scale Score: 17    Readmission Risk              Risk of Unplanned Readmission:  17           Discussed patient goal for the day, patient clinical progression, and barriers to discharge.  The following Goal(s) of the Day/Commitment(s) have been identified:  Other  possible heart cath tomorrow      Polina Peace RN  March 31, 2024        
  [facility] Quality Flow/Interdisciplinary Rounds Progress Note        Quality Flow Rounds held on March 30, 2024    Disciplines Attending:  Bedside Nurse, , , and Nursing Unit Leadership    Luis Silva was admitted on 3/26/2024  9:03 PM    Anticipated Discharge Date:       Disposition:    Les Score:  Les Scale Score: 16    Readmission Risk              Risk of Unplanned Readmission:  16           Discussed patient goal for the day, patient clinical progression, and barriers to discharge.  The following Goal(s) of the Day/Commitment(s) have been identified:  Labs - Report Results      Elba Abdalla RN  March 30, 2024        
4 Eyes Skin Assessment     NAME:  Luis Silva  YOB: 1940  MEDICAL RECORD NUMBER:  [id number]    The patient is being assessed for  Admission    I agree that at least one RN has performed a thorough Head to Toe Skin Assessment on the patient. ALL assessment sites listed below have been assessed.      Areas assessed by both nurses:    Head, Face, Ears, Shoulders, Back, Chest, Arms, Elbows, Hands, Sacrum. Buttock, Coccyx, Ischium, Legs. Feet and Heels, and Under Medical Devices         Does the Patient have a Wound? No noted wound(s)       Les Prevention initiated by RN: Yes  Wound Care Orders initiated by RN: No    Pressure Injury (Stage 3,4, Unstageable, DTI, NWPT, and Complex wounds) if present, place Wound referral order by RN under : No    New Ostomies, if present place, Ostomy referral order under : No     Nurse 1 eSignature: Electronically signed by Ellie Diaz RN on 3/26/24 at 11:45 PM EDT    **SHARE this note so that the co-signing nurse can place an eSignature**    Nurse 2 eSignature: Electronically signed by Janeth Stein RN on 3/26/24 at 11:46 PM EDT  
Cardiology and palliative care consults sent via [employer].  
Catia johnson notified via perfect serve that patient bp 95/50 hr 53 for hold parameters on toprol and losartan.   
Comprehensive Nutrition Assessment    Type and Reason for Visit:  Initial (LOS)    Nutrition Recommendations/Plan:   Recommend and start Glucerna supplement daily to help meet nutritional needs.           Malnutrition Assessment:  Malnutrition Status:  At risk for malnutrition (Comment) (04/02/24 1138)    Context:  Acute Illness     Findings of the 6 clinical characteristics of malnutrition:  Energy Intake:  75% or less of estimated energy requirements for 7 or more days  Weight Loss:  Unable to assess (d/t possible fluid shifts ; hx of CHF)     Body Fat Loss:  Unable to assess     Muscle Mass Loss:  Unable to assess    Fluid Accumulation:  No significant fluid accumulation     Strength:  Not Performed    Nutrition Assessment:    Patient adm from local nursing home w/ complaints of SOB and BLE edema ; hx of DM/CHF/CKD/COPD/DVT ; 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4 % IBW. Weight Source: Bed Scale  Current BMI (kg/m2): 28.9  Usual Body Weight:  (UTO ; no weights available in EMR hx from previous encounters)                       BMI Categories: Overweight (BMI 25.0-29.9)    Estimated Daily Nutrient Needs:  Energy Requirements Based On: Formula  Weight Used for Energy Requirements: Current  Energy (kcal/day): 6523-5478 (REE 1543 x 1.1 SF)  Weight Used for Protein Requirements: 
Dietician consult called.  
Nurse to nurse called to nkechi at [facility]. Kobe printed and placed in discharge folder and sent with ambulance. Iv removed, dressing applied. Telemetry removed, cleaned, and returned to nurses station. Patient in possession of all bedside belongings upon discharge from the unit.   
Nutrition Education    Provided educational handouts and sample meal plans in discharge instructions if needed. Patient admitted from nursing home.    Will attempt to  at a later time when patient is less confused.    Provided handouts on cardiac/diabetic diet.    Devon Mckinney RD, LD  Contact Number: [phone]    
OCCUPATIONAL THERAPY INITIAL EVALUATION    [facility]  [address], [place]      Date:3/28/2024                                                  Patient Name: Luis Silva  MRN: [id number]  : 1940  Room: [address] [place] [address]    Evaluating OT: Luanne Madera, MOT, OTR/L  # [id number]    Referring Provider:  Dee Dee Lopez MD      Specific Provider Orders:  \"OT Eval and Treat\"  3-27-24    Diagnosis: Acute congestive heart failure, unspecified heart failure type (HCC) [I50.9]    Pt was admitted w/ SOB, Esteban LE edema.  Transferred from [facility] to [facility] to here    Pertinent Medical History:  Pt has no past medical history on file.,  has no past surgical history on file.    Surgeries this admission: None     Precautions:  Fall Risk  2L O2, continuous pulse-ox  External catheter, incontinent bowel/bladder    Assessment of current deficits   [x] Functional mobility  [x]ADLs  [x] Strength               [x]Cognition   [x] Functional transfers   [x] IADLs         [x] Safety Awareness   [x]Endurance   [] Fine Coordination              [x] Balance     [] Vision/perception   []Sensation    []Gross Motor Coordination  [] ROM  [] Delirium                  [] Motor Control       OT PLAN OF CARE   OT POC based on physician orders, patient diagnosis and results of clinical assessment    Frequency/Duration 1-3 days/wk for 2 weeks PRN   Specific OT Treatment to include:   * Instruction/training on adapted ADL techniques and AE recommendations to increase functional independence within precautions       * Training on energy conservation strategies, correct breathing pattern and techniques to improve independence/tolerance for self-care routine  * Functional transfer/mobility training/DME recommendations for increased independence, safety, and fall prevention  * Patient/Family education to increase follow through with safety techniques and functional 
Okay to change pt back to previous diet per cardiology via perfect serve.    Lizzette Barrientos RN.    
Patient is seen in follow-up for heart failure, paroxysmal atrial fibrillation    Subjective:     Mr. Silva feels okay today, denies chest pain or shortness of breath  Laying in bed no apparent distress    ROS:  CONSTITUTIONAL:  negative for  fevers, chills  HEENT:  negative for earaches, nasal congestion and epistaxis  RESPIRATORY:  negative for  dry cough, cough with sputum,wheezing and hemoptysis  GASTROINTESTINAL:  negative for nausea, vomiting  MUSCULOSKELETAL:  negative for  myalgias, arthralgias  NEUROLOGICAL:  negative for visual disturbance, dysphagia    Medication side effects: none    Scheduled Meds:   insulin glargine  5 Units SubCUTAneous Nightly    furosemide  40 mg Oral BID    potassium chloride  20 mEq Oral Daily with breakfast    guaiFENesin  400 mg Oral TID    azithromycin (ZITHROMAX) 500 mg in sodium chloride 0.9 % 250 mL IVPB  500 mg IntraVENous Q24H    predniSONE  20 mg Oral Daily    folic acid  1 mg Oral Daily    budesonide  0.5 mg Nebulization BID RT    arformoterol tartrate  15 mcg Nebulization BID RT    ipratropium 0.5 mg-albuterol 2.5 mg  1 Dose Inhalation Q4H WA RT    white petrolatum   Topical BID    apixaban  5 mg Oral BID    ferrous sulfate  325 mg Oral Daily with breakfast    losartan  25 mg Oral Daily    amiodarone  200 mg Oral Daily    dilTIAZem  120 mg Oral Daily    doxazosin  2 mg Oral Daily    aspirin  81 mg Oral Daily    sodium chloride flush  5-40 mL IntraVENous 2 times per day    insulin lispro  0-4 Units SubCUTAneous TID WC    insulin lispro  0-4 Units SubCUTAneous Nightly     Continuous Infusions:   sodium chloride      dextrose       PRN Meds:perflutren lipid microspheres, sodium chloride flush, sodium chloride, ondansetron **OR** ondansetron, polyethylene glycol, acetaminophen **OR** acetaminophen, glucose, dextrose bolus **OR** dextrose bolus, glucagon (rDNA), dextrose, ipratropium 0.5 mg-albuterol 2.5 mg    I/O last 3 completed shifts:  In: 0   Out: 2600 [Urine:2200; 
Physical Therapy  Physical Therapy Initial Assessment    Name: Luis Silva  : 1940  MRN: [id number]      Date of Service: 3/28/2024    Evaluating PT:  Vivek Simmons PT, DPT [id number]    Room #:  6501/6501-A  Diagnosis:  Acute congestive heart failure, unspecified heart failure type (HCC) [I50.9]  PMHx/PSHx:  atrial fibrillation, HTN, CKD 3a, DVT, COPD, GERD  Procedure/Surgery:  None  Precautions:  Falls, O2, external catheter  Equipment Needs:  TBD    SUBJECTIVE:    Pt is from nursing facility.  Pt used wheelchair for mobility prior to admission; ambulated short distances using walker with assistance.    OBJECTIVE:   Initial Evaluation  Date: 3/28/2024 Treatment Short Term/ Long Term   Goals   AM-PAC 6 Clicks      Was pt agreeable to Eval/treatment? Yes     Does pt have pain? R heel     Bed Mobility  Rolling: NT  Supine to sit: ModA  Sit to supine: ModA  Scooting: ModA (seated)  Rolling: Independent  Supine to sit: Supervision  Sit to supine: Supervision  Scooting: Independent   Transfers Sit to stand: ModA  Stand to sit: ModA  Stand pivot: NT  Sit to stand: SBA  Stand to sit: SBA  Stand pivot: SBA with AAD   Ambulation    10 feet with WW Sarath  75 feet with AAD SBA   Stair negotiation: ascended and descended  NT  TBD   Wheelchair mobility NT  300 feet Independent   ROM BUE:  See OT note  BLE:  WFL     Strength BUE:  See OT note  BLE:  Grossly 5/5     Balance Sitting EOB:  SBA  Dynamic Standing:  Sarath with WW  Sitting EOB:  Supervision  Dynamic Standing:  SBA with AAD     Sensation:  No reports of numbness/tingling to extremities  Edema:  Unremarkable    Vitals:   , SpO2 97% at rest.  -136, SpO2 94-95% with activity.    Patient education  Pt educated on role of PT, safety during functional mobility, use of call light for assistance.    Patient response to education:   Pt expressed understanding Pt demonstrated skill Pt requires further education in this area   Yes Yes Yes 
Physical Therapy  Treatment Note    Name: Luis Silva  : 1940  MRN: [id number]      Date of Service: 3/31/2024    Evaluating PT:  Vivek Simmons PT, DPT [id number]    Room #:  6501/6501-A  Diagnosis:  Acute congestive heart failure, unspecified heart failure type (HCC) [I50.9]  PMHx/PSHx:  atrial fibrillation, HTN, CKD 3a, DVT, COPD, GERD  Procedure/Surgery:  None  Precautions:  Falls, O2, external catheter, incontinence  Equipment Needs:  TBD    SUBJECTIVE:    Pt is from nursing facility.  Pt used wheelchair for mobility prior to admission; ambulated short distances using walker with assistance.    OBJECTIVE:   Initial Evaluation  Date: 3/28/2024 Treatment  Date: 3/31/24 Short Term/ Long Term   Goals   AM-PAC 6 Clicks 12/24 15/24    Was pt agreeable to Eval/treatment? Yes yes    Does pt have pain? R heel No pain    Bed Mobility  Rolling: NT  Supine to sit: ModA  Sit to supine: ModA  Scooting: ModA (seated) Rolling: NT  Supine to sit: min A  Sit to supine: NT  Scooting: min A Rolling: Independent  Supine to sit: Supervision  Sit to supine: Supervision  Scooting: Independent   Transfers Sit to stand: ModA  Stand to sit: ModA  Stand pivot: NT Sit to stand: min A  Stand to sit: min A  Stand pivot: min A with ww Sit to stand: SBA  Stand to sit: SBA  Stand pivot: SBA with AAD   Ambulation    10 feet with WW Sarath 20'x2 with ww mod A 75 feet with AAD SBA   Stair negotiation: ascended and descended  NT NT TBD   Wheelchair mobility NT  feet Independent   ROM BUE:  See OT note  BLE:  WFL     Strength BUE:  See OT note  BLE:  Grossly 5/5     Balance Sitting EOB:  SBA  Dynamic Standing:  Sarath with WW Sitting EOB:  SBA  Dynamic Standing:  mod A with ww Sitting EOB:  Supervision  Dynamic Standing:  SBA with AAD   Pt is A & O x 3  Sensation:  Pt denies numbness and tingling to extremities  Edema:  unremarkable    Vitals:  SpO2 and HR were stable during session    Therapeutic Exercises:    Bed mobility: supine>sit, 
Pt ok to dc from Dr Raquel VALDEZ   
Right Radial Vascband off at 1824 with no complications.  
Voicemail left for Echo department that Dr. Khan will read the Echo once completed.   
applicable / Not valid  -- Disagree - Clinically unable to determine / Unknown  -- Refer to Clinical Documentation Reviewer    PROVIDER RESPONSE TEXT:    This patient is in acute respiratory failure as evidenced by hypoxia    Query created by: Celeste Santana on 3/29/2024 1:55 PM      Electronically signed by:  Dee Dee Lopez MD 3/29/2024 2:00 PM          
bolus, 125 mL, PRN   Or  dextrose bolus, 250 mL, PRN  glucagon (rDNA), 1 mg, PRN  dextrose, , Continuous PRN  ipratropium 0.5 mg-albuterol 2.5 mg, 1 Dose, Q4H PRN         Objective:    /64   Pulse 64   Temp 97 °F (36.1 °C) (Temporal)   Resp 18   Ht 1.727 m (5' 8\")   Wt 88 kg (194 lb 0.1 oz)   SpO2 98%   BMI 29.50 kg/m²     General Appearance: alert and oriented to person, place and time and in no acute distress  Skin: warm and dry  Head: normocephalic and atraumatic  Eyes: pupils equal, round, and reactive to light, extraocular eye movements intact, conjunctivae normal  Neck: neck supple and non tender without mass   Pulmonary/Chest: Decreased breath sounds bilaterally, on 2 L nasal cannula  Cardiovascular: Irregular rhythm, regular rate, normal S1 and S2 and no carotid bruits  Abdomen: soft, non-tender, non-distended, normal bowel sounds, no masses or organomegaly  Extremities: no cyanosis, no clubbing. + edema  Neurologic: no cranial nerve deficit and speech normal        Recent Labs     03/30/24  0404 03/31/24  0819 04/01/24  0420    140 142   K 3.2* 3.6 3.7    103 101   CO2 28 29 30*   BUN 20 22 29*   CREATININE 1.3* 1.3* 1.6*   GLUCOSE 180* 162* 136*   CALCIUM 8.1* 8.1* 8.5*       Recent Labs     03/30/24  0404 03/31/24  0425 04/01/24  0420   WBC 7.2 8.9 10.4   RBC 2.78* 2.71* 2.86*   HGB 8.6* 8.6* 8.9*   HCT 26.9* 26.5* 28.0*   MCV 96.8 97.8 97.9   MCH 30.9 31.7 31.1   MCHC 32.0 32.5 31.8*   RDW 13.8 13.9 13.8    241 242   MPV 11.0 11.1 11.0       Labs and imaging reviewed.      Assessment:    CHF exacerbation  Elevated troponin  Volume overload  Acute hypoxic respiratory failure, not on home oxygen  Elevated BNP  Normocytic anemia  QTc prolongation  A-fib, on chronic anticoagulation  Insulin-dependent type 2 diabetes, A1c 6.3%  Hypertension  CKD stage IIIa  History of DVT  COPD, acute exacerbation  GERD      Plan:  Cardiology consulted, continue oral Lasix, Kcl 20 mEq daily, 
Ejection fraction is 68%.    Perfusion Comments: Large-sized (severe) partially reversible perfusion defect in the apex. Reversible perfusion defect in the mid inferior wall.    Imaging  XR CHEST PORTABLE   Final Result   Cardiomegaly with small bilateral pleural effusions in the areas of   compression atelectasis in the lower lung bases.  Cannot exclude a component   of decompensated congestive heart failure.         Vascular duplex lower extremity venous bilateral   Final Result   No evidence of DVT in either lower extremity.         XR CHEST PORTABLE   Final Result   1. Increased cardiomediastinal and interstitial prominence concerning for   developing congestive heart failure.   2. Small bilateral pleural effusions.             Lab Review   Lab Results   Component Value Date/Time     03/31/2024 08:19 AM    K 3.6 03/31/2024 08:19 AM     03/31/2024 08:19 AM    CO2 29 03/31/2024 08:19 AM    BUN 22 03/31/2024 08:19 AM    CREATININE 1.3 03/31/2024 08:19 AM    GLUCOSE 162 03/31/2024 08:19 AM    CALCIUM 8.1 03/31/2024 08:19 AM     Lab Results   Component Value Date/Time    WBC 8.9 03/31/2024 04:25 AM    HGB 8.6 03/31/2024 04:25 AM    HCT 26.5 03/31/2024 04:25 AM    MCV 97.8 03/31/2024 04:25 AM     03/31/2024 04:25 AM     I have personally reviewed the laboratory, cardiac diagnostic and radiographic testing as outlined above:    Assessment:     1.  Acute on chronic diastolic congestive heart failure: Improving, will continue current treatment  2.  Abnormal stress test: Will need cardiac catheterization, procedure, alternatives, risks, benefits, discussed with him, is willing to proceed.  3.  Elevated troponin: As above  4.  Persistent atrial fibrillation: Now in sinus rhythm, anticoagulation is on hold in anticipation of cardiac catheterization  5.  Abnormal EKG with right bundle branch block  6.  Hypertension: Controlled  7.  Chronic anemia  8.  Stage III chronic kidney disease  9.  Type 2 diabetes 
Problem solving:  fair               Judgement/safety:  fair              Communication:  fair               Insight:  fair                 Functional Assessment:  AM-PAC Daily Activity Raw Score: 14/24       Initial Eval Status  Date: 3-28-24    Treatment Status  Date:  3/31/24 STGs = LTGs  Time frame: 10-14 days   Feeding Set up        Set up  Recommending meals up in chair  NA   Grooming SUP/set up     Light ax seated EOB  Unable to tolerate ambulating to or standing at sink     SBA  Seated for energy conservation, washing face, hands & brushing hair  SUP/set up  Seated/Standing at the Sink   UB Dressing Mod A/set up     Donning robe seated EOB     Min A  Doff/berry gown seated  SUP/set up      LB Dressing Max A/set up     Max A donning footwear seated EOB, pants Simulated seated/standing at b/s   w/ Mod A for safety w/ standing balance during ax w/ WW  Pt ed for safe/adaptive techs, use of adaptive equip     Max A  Donning socks x 2, once at EOB & once on commode due to incontinence of BM, will educate on AE   Donned brief seated to thread legs, then standing to pull over hips   Limited by reach, standing balance & SOB Min A/set up      Bathing Max A/set up     Min A - UB - seated EOB  Max A - LB seated/standing EOB  Simulated  Pt ed re: Benefits of use of Shower chair/Tub Bench, resources for obtaining equip     Max A  Overall simulated  Washing B LE & corby-area this date due to incontinence of BM Min A/set up       Toileting Max A     Incontinent - Max A for hygiene and simulated clothing adjustment w/ Mod A for safety w/ standing balance during task  VCs for safety     Dep  Incontinent of BM when walking to bathroom, able to complete tasks in bathroom, pt having diarrhea, informed nsg   Min A      Bed Mobility  Supine to sit: Mod A   Sit to supine:  Mod A      VCs for safety/hand placement     Min A  Supine to sit  Supine to sit: SUP  Sit to supine: SUP      Functional Transfers Mod A     EOB 2x  Pt ed for 
8 (severe pain)

## 2024-04-02 NOTE — ACP (ADVANCE CARE PLANNING)
Advance Care Planning   Healthcare Decision Maker:    Primary Decision Maker: Rose kaur - [facility] - [phone]    Click here to complete Healthcare Decision Makers including selection of the Healthcare Decision Maker Relationship (ie \"Primary\").

## 2024-04-02 NOTE — DISCHARGE SUMMARY
Hospitalist Discharge Summary    Patient ID: Luis Silva   Patient : 1940  Patient's PCP: No primary care provider on file.    Admit Date: 3/26/2024   Admitting Physician: Dee Dee Lopez MD    Discharge Date:  2024  Discharge Physician: Jeff Caicedo MD   Discharge Condition: Stable  Discharge Disposition: Skilled Facility    History of presenting illness:  83-year-old male history of A-fib, CKD, DVT, COPD presented with shortness of elevated BNP concerning for CHF exacerbation and also bilateral pleural effusions.  Patient follows outpatient with for no reason.  Seen by cardiology and started on diuretics.  For shortness to oral Lasix 40 mg daily.  Stress test was obtained negative for ischemia but did show a large severe partially reversible perfusion defect in the apex and reversible perfusion defect in the mid inferior wall.  Status post heart cath with no new disease or chronic CAD findings.  Cardizem was switched to Toprol-XL.  Eliquis for A-fib.  Completed prednisone and azithromycin during his hospital stay.  Atypicals were negative.  Patient reported that the symptom he had was in his abdomen and epigastric region followed by reflux as his had this in the past and improved with omeprazole.  Discussed to continue omeprazole 40 mg at discharge.  Patient once mentioned about being suicidal but on further questioning he denied and mentioned that he only said that because he was frustrated because nobody was answering his call button.  He adamantly denies any current or previous suicidal ideation or any plans of harming himself.    Hospital course in brief:  (Please refer to daily progress notes for a comprehensive review of the hospitalization by requesting medical records)  As above    Consults:   IP CONSULT TO HEART FAILURE NURSE/COORDINATOR  IP CONSULT TO DIETITIAN  IP CONSULT TO CARDIOLOGY  IP CONSULT TO PALLIATIVE CARE  IP CONSULT TO HEART FAILURE NURSE/COORDINATOR  IP CONSULT

## 2024-04-02 NOTE — PATIENT CARE CONFERENCE
P Quality Flow/Interdisciplinary Rounds Progress Note        Quality Flow Rounds held on April 2, 2024    Disciplines Attending:  Bedside Nurse, , , and Nursing Unit Leadership    Luis Silva was admitted on 3/26/2024  9:03 PM    Anticipated Discharge Date:       Disposition:    Les Score:  Les Scale Score: 18    Readmission Risk              Risk of Unplanned Readmission:  17           Discussed patient goal for the day, patient clinical progression, and barriers to discharge.  The following Goal(s) of the Day/Commitment(s) have been identified:  downgrade/discharge planning       Staci Jimenez RN  April 2, 2024

## 2024-04-18 ENCOUNTER — TELEPHONE (OUTPATIENT)
Dept: OTHER | Age: 84
End: 2024-04-18

## 2024-04-18 DIAGNOSIS — Z13.9 ENCOUNTER FOR SCREENING INVOLVING SOCIAL DETERMINANTS OF HEALTH (SDOH): ICD-10-CM

## 2024-04-18 DIAGNOSIS — Z74.2 NEEDS ASSISTANCE WHILE AT HOME: Primary | ICD-10-CM

## 2024-04-18 DIAGNOSIS — Z74.2 NO ONE AVAILABLE AT HOME TO CARE FOR PATIENT: ICD-10-CM

## 2024-04-18 DIAGNOSIS — Z74.8: ICD-10-CM

## 2024-04-18 DIAGNOSIS — Z74.1 ASSISTANCE NEEDED FOR BATHING: ICD-10-CM

## 2024-04-18 NOTE — TELEPHONE ENCOUNTER
CHF CHW Follow up Call  4/18/2024  3:13 PM     Notes: Sent referral to [facility]. They are going to call pt's wife and schedule an assessment. Wife might also need the assistance too.     CHW instructed patient to call CHF CHW at [phone] for further assistance.    Electronically signed by Linda Meléndez on 4/18/2024 at 3:13 PM

## 2024-04-18 NOTE — TELEPHONE ENCOUNTER
CHF CHW Initial Call  4/18/2024  3:00 PM    CHW received referral from VIKAS Laguna.  Patient need: [facility].   Notes: CHW called patient to assist with sending a referral to [facility]. I told pt's wife about program and she said they would greatly appreciate the referral. Pt is currently at a facility and his wife does not know when he will come home. I told her that I will sent the referral and then they can at least assess the home for the needs and once he's back they can start the process.          Patient in agreement with plan and further assistance.  [x] Agreed    [] Declined      CHW informed patient of the services and resources that can be provided to them. CHW instructed patient to call [facility] CHW at [phone] for any future assistance.     Electronically signed by Linda Meléndez on 4/18/2024 at 3:00 PM

## 2024-04-25 PROBLEM — R79.89 ELEVATED TROPONIN: Status: RESOLVED | Noted: 2024-03-26 | Resolved: 2024-04-25

## (undated) DEVICE — TUBING PRSS MON L12IN PVC RIG NONEXPANDING M TO FEM CONN

## (undated) DEVICE — PACK SURG CARDIAC CATH

## (undated) DEVICE — GLIDESHEATH NITINOL HYDROPHILIC COATED INTRODUCER SHEATH: Brand: GLIDESHEATH

## (undated) DEVICE — CATHETER COR DIAG JUDKINS L 3.5 CRV 6FR 100CM 0 SIDE H

## (undated) DEVICE — SURGICAL PROCEDURE KIT 3 W

## (undated) DEVICE — PAD, DEFIB, ADULT, RADIOTRAN, PHYSIO, LO: Brand: MEDLINE

## (undated) DEVICE — BAND COMPR L24CM REG CLR PLAS HEMSTAT EXT HK AND LOOP RETEN

## (undated) DEVICE — GUIDEWIRE VASC L260CM 0.035IN J TIP L3MM PTFE FIX COR NAMIC

## (undated) DEVICE — CANNULA NSL CANN NSL L25FT TBNG AD O2 SUP SFT UC

## (undated) DEVICE — CATHETER DIAG 6FR L100CM LUMN ID0.056IN JR4 CRV 0 SIDE H

## (undated) DEVICE — CATHETER DIAG 6FR L100CM JUDKINS L JL 5.0 DXTERITY

## (undated) DEVICE — CATHETER DIAG 6FR L100CM LUMN ID0.056IN JL4 CRV 0 SIDE H

## (undated) DEVICE — ANGIO-SEAL VIP VASCULAR CLOSURE DEVICE: Brand: ANGIO-SEAL

## (undated) DEVICE — MICROPUNCTURE INTRODUCER SET SILHOUETTE TRANSITIONLESS PUSH-PLUS DESIGN - STIFFENED CANNULA WITH STAINLESS STEEL WIRE GUIDE: Brand: MICROPUNCTURE

## (undated) DEVICE — SHEATH INTRO 6FR L11CM 0.038IN SIL TRICSP VLV W/ GWIRE